# Patient Record
Sex: FEMALE | Race: WHITE | Employment: FULL TIME | URBAN - METROPOLITAN AREA
[De-identification: names, ages, dates, MRNs, and addresses within clinical notes are randomized per-mention and may not be internally consistent; named-entity substitution may affect disease eponyms.]

---

## 2022-11-05 SDOH — HEALTH STABILITY: PHYSICAL HEALTH: ON AVERAGE, HOW MANY MINUTES DO YOU ENGAGE IN EXERCISE AT THIS LEVEL?: 30 MIN

## 2022-11-05 SDOH — HEALTH STABILITY: PHYSICAL HEALTH: ON AVERAGE, HOW MANY DAYS PER WEEK DO YOU ENGAGE IN MODERATE TO STRENUOUS EXERCISE (LIKE A BRISK WALK)?: 5 DAYS

## 2022-11-05 ASSESSMENT — SOCIAL DETERMINANTS OF HEALTH (SDOH)
WITHIN THE LAST YEAR, HAVE TO BEEN RAPED OR FORCED TO HAVE ANY KIND OF SEXUAL ACTIVITY BY YOUR PARTNER OR EX-PARTNER?: NO
WITHIN THE LAST YEAR, HAVE YOU BEEN HUMILIATED OR EMOTIONALLY ABUSED IN OTHER WAYS BY YOUR PARTNER OR EX-PARTNER?: NO
WITHIN THE LAST YEAR, HAVE YOU BEEN KICKED, HIT, SLAPPED, OR OTHERWISE PHYSICALLY HURT BY YOUR PARTNER OR EX-PARTNER?: NO
WITHIN THE LAST YEAR, HAVE YOU BEEN AFRAID OF YOUR PARTNER OR EX-PARTNER?: NO

## 2022-11-08 ENCOUNTER — HOSPITAL ENCOUNTER (OUTPATIENT)
Dept: MAMMOGRAPHY | Age: 42
Discharge: HOME | End: 2022-11-11
Payer: COMMERCIAL

## 2022-11-08 ENCOUNTER — OFFICE VISIT (OUTPATIENT)
Dept: INTERNAL MEDICINE CLINIC | Facility: CLINIC | Age: 42
End: 2022-11-08
Payer: COMMERCIAL

## 2022-11-08 ENCOUNTER — APPOINTMENT (OUTPATIENT)
Dept: MAMMOGRAPHY | Age: 42
End: 2022-11-08
Payer: COMMERCIAL

## 2022-11-08 VITALS
WEIGHT: 134.2 LBS | HEIGHT: 60 IN | DIASTOLIC BLOOD PRESSURE: 93 MMHG | HEART RATE: 132 BPM | SYSTOLIC BLOOD PRESSURE: 143 MMHG | BODY MASS INDEX: 26.35 KG/M2

## 2022-11-08 DIAGNOSIS — R00.0 TACHYCARDIA: ICD-10-CM

## 2022-11-08 DIAGNOSIS — N63.10 MASS OF RIGHT BREAST, UNSPECIFIED QUADRANT: ICD-10-CM

## 2022-11-08 DIAGNOSIS — Z00.00 ROUTINE GENERAL MEDICAL EXAMINATION AT A HEALTH CARE FACILITY: ICD-10-CM

## 2022-11-08 DIAGNOSIS — N63.10 MASS OF RIGHT BREAST, UNSPECIFIED QUADRANT: Primary | ICD-10-CM

## 2022-11-08 DIAGNOSIS — Z12.31 BREAST CANCER SCREENING BY MAMMOGRAM: ICD-10-CM

## 2022-11-08 LAB
25(OH)D3 SERPL-MCNC: 36.6 NG/ML (ref 30–100)
ALBUMIN SERPL-MCNC: 2.2 G/DL (ref 3.5–5)
ALBUMIN/GLOB SERPL: 0.4 {RATIO} (ref 0.4–1.6)
ALP SERPL-CCNC: 165 U/L (ref 50–136)
ALT SERPL-CCNC: 15 U/L (ref 12–65)
ANION GAP SERPL CALC-SCNC: 6 MMOL/L (ref 2–11)
AST SERPL-CCNC: 15 U/L (ref 15–37)
BILIRUB SERPL-MCNC: 0.3 MG/DL (ref 0.2–1.1)
BUN SERPL-MCNC: 9 MG/DL (ref 6–23)
CALCIUM SERPL-MCNC: 8.9 MG/DL (ref 8.3–10.4)
CHLORIDE SERPL-SCNC: 103 MMOL/L (ref 101–110)
CHOLEST SERPL-MCNC: 109 MG/DL
CO2 SERPL-SCNC: 29 MMOL/L (ref 21–32)
CREAT SERPL-MCNC: 0.6 MG/DL (ref 0.6–1)
EST. AVERAGE GLUCOSE BLD GHB EST-MCNC: 114 MG/DL
GLOBULIN SER CALC-MCNC: 4.9 G/DL (ref 2.8–4.5)
GLUCOSE SERPL-MCNC: 119 MG/DL (ref 65–100)
HBA1C MFR BLD: 5.6 % (ref 4.8–5.6)
HCV AB SER QL: NONREACTIVE
HDLC SERPL-MCNC: 28 MG/DL (ref 40–60)
HDLC SERPL: 3.9 {RATIO}
HIV 1+2 AB+HIV1 P24 AG SERPL QL IA: NONREACTIVE
HIV 1/2 RESULT COMMENT: NORMAL
LDLC SERPL CALC-MCNC: 65.6 MG/DL
POTASSIUM SERPL-SCNC: 3.8 MMOL/L (ref 3.5–5.1)
PROT SERPL-MCNC: 7.1 G/DL (ref 6.3–8.2)
SODIUM SERPL-SCNC: 138 MMOL/L (ref 133–143)
TRIGL SERPL-MCNC: 77 MG/DL (ref 35–150)
TSH W FREE THYROID IF ABNORMAL: 2.3 UIU/ML (ref 0.36–3.74)
VLDLC SERPL CALC-MCNC: 15.4 MG/DL (ref 6–23)

## 2022-11-08 PROCEDURE — 77066 DX MAMMO INCL CAD BI: CPT

## 2022-11-08 PROCEDURE — 99205 OFFICE O/P NEW HI 60 MIN: CPT | Performed by: INTERNAL MEDICINE

## 2022-11-08 PROCEDURE — 76642 ULTRASOUND BREAST LIMITED: CPT

## 2022-11-08 RX ORDER — IBUPROFEN 200 MG
200 TABLET ORAL EVERY 6 HOURS PRN
COMMUNITY

## 2022-11-08 SDOH — ECONOMIC STABILITY: FOOD INSECURITY: WITHIN THE PAST 12 MONTHS, YOU WORRIED THAT YOUR FOOD WOULD RUN OUT BEFORE YOU GOT MONEY TO BUY MORE.: NEVER TRUE

## 2022-11-08 SDOH — ECONOMIC STABILITY: INCOME INSECURITY: IN THE LAST 12 MONTHS, WAS THERE A TIME WHEN YOU WERE NOT ABLE TO PAY THE MORTGAGE OR RENT ON TIME?: NO

## 2022-11-08 SDOH — ECONOMIC STABILITY: FOOD INSECURITY: WITHIN THE PAST 12 MONTHS, THE FOOD YOU BOUGHT JUST DIDN'T LAST AND YOU DIDN'T HAVE MONEY TO GET MORE.: NEVER TRUE

## 2022-11-08 SDOH — ECONOMIC STABILITY: HOUSING INSECURITY: IN THE LAST 12 MONTHS, HOW MANY PLACES HAVE YOU LIVED?: 1

## 2022-11-08 SDOH — ECONOMIC STABILITY: HOUSING INSECURITY
IN THE LAST 12 MONTHS, WAS THERE A TIME WHEN YOU DID NOT HAVE A STEADY PLACE TO SLEEP OR SLEPT IN A SHELTER (INCLUDING NOW)?: NO

## 2022-11-08 SDOH — ECONOMIC STABILITY: TRANSPORTATION INSECURITY
IN THE PAST 12 MONTHS, HAS LACK OF TRANSPORTATION KEPT YOU FROM MEETINGS, WORK, OR FROM GETTING THINGS NEEDED FOR DAILY LIVING?: NO

## 2022-11-08 SDOH — ECONOMIC STABILITY: TRANSPORTATION INSECURITY
IN THE PAST 12 MONTHS, HAS THE LACK OF TRANSPORTATION KEPT YOU FROM MEDICAL APPOINTMENTS OR FROM GETTING MEDICATIONS?: NO

## 2022-11-08 ASSESSMENT — ENCOUNTER SYMPTOMS
CONSTIPATION: 0
COUGH: 0
SHORTNESS OF BREATH: 0
DIARRHEA: 0
EYE REDNESS: 0
VOMITING: 0
ABDOMINAL PAIN: 0

## 2022-11-08 ASSESSMENT — PATIENT HEALTH QUESTIONNAIRE - PHQ9
4. FEELING TIRED OR HAVING LITTLE ENERGY: 3
SUM OF ALL RESPONSES TO PHQ QUESTIONS 1-9: 18
SUM OF ALL RESPONSES TO PHQ QUESTIONS 1-9: 18
2. FEELING DOWN, DEPRESSED OR HOPELESS: 3
9. THOUGHTS THAT YOU WOULD BE BETTER OFF DEAD, OR OF HURTING YOURSELF: 0
8. MOVING OR SPEAKING SO SLOWLY THAT OTHER PEOPLE COULD HAVE NOTICED. OR THE OPPOSITE, BEING SO FIGETY OR RESTLESS THAT YOU HAVE BEEN MOVING AROUND A LOT MORE THAN USUAL: 0
3. TROUBLE FALLING OR STAYING ASLEEP: 3
5. POOR APPETITE OR OVEREATING: 3
7. TROUBLE CONCENTRATING ON THINGS, SUCH AS READING THE NEWSPAPER OR WATCHING TELEVISION: 3
6. FEELING BAD ABOUT YOURSELF - OR THAT YOU ARE A FAILURE OR HAVE LET YOURSELF OR YOUR FAMILY DOWN: 0
SUM OF ALL RESPONSES TO PHQ QUESTIONS 1-9: 18
1. LITTLE INTEREST OR PLEASURE IN DOING THINGS: 3
SUM OF ALL RESPONSES TO PHQ QUESTIONS 1-9: 18
SUM OF ALL RESPONSES TO PHQ9 QUESTIONS 1 & 2: 6
10. IF YOU CHECKED OFF ANY PROBLEMS, HOW DIFFICULT HAVE THESE PROBLEMS MADE IT FOR YOU TO DO YOUR WORK, TAKE CARE OF THINGS AT HOME, OR GET ALONG WITH OTHER PEOPLE: 0

## 2022-11-08 ASSESSMENT — SOCIAL DETERMINANTS OF HEALTH (SDOH)
DO YOU BELONG TO ANY CLUBS OR ORGANIZATIONS SUCH AS CHURCH GROUPS UNIONS, FRATERNAL OR ATHLETIC GROUPS, OR SCHOOL GROUPS?: NO
IN A TYPICAL WEEK, HOW MANY TIMES DO YOU TALK ON THE PHONE WITH FAMILY, FRIENDS, OR NEIGHBORS?: MORE THAN THREE TIMES A WEEK
HOW OFTEN DO YOU ATTEND CHURCH OR RELIGIOUS SERVICES?: NEVER
HOW OFTEN DO YOU GET TOGETHER WITH FRIENDS OR RELATIVES?: NEVER
ARE YOU MARRIED, WIDOWED, DIVORCED, SEPARATED, NEVER MARRIED, OR LIVING WITH A PARTNER?: NEVER MARRIED
HOW OFTEN DO YOU ATTENT MEETINGS OF THE CLUB OR ORGANIZATION YOU BELONG TO?: NEVER
HOW HARD IS IT FOR YOU TO PAY FOR THE VERY BASICS LIKE FOOD, HOUSING, MEDICAL CARE, AND HEATING?: NOT HARD AT ALL

## 2022-11-08 ASSESSMENT — LIFESTYLE VARIABLES
HOW OFTEN DO YOU HAVE A DRINK CONTAINING ALCOHOL: NEVER
HOW MANY STANDARD DRINKS CONTAINING ALCOHOL DO YOU HAVE ON A TYPICAL DAY: PATIENT DOES NOT DRINK
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
## 2022-11-09 ENCOUNTER — TELEPHONE (OUTPATIENT)
Dept: INTERNAL MEDICINE CLINIC | Facility: CLINIC | Age: 42
End: 2022-11-09

## 2022-11-09 ENCOUNTER — OFFICE VISIT (OUTPATIENT)
Dept: SURGERY | Age: 42
End: 2022-11-09
Payer: COMMERCIAL

## 2022-11-09 ENCOUNTER — HOSPITAL ENCOUNTER (OUTPATIENT)
Dept: MAMMOGRAPHY | Age: 42
Discharge: HOME | End: 2022-11-12
Payer: COMMERCIAL

## 2022-11-09 ENCOUNTER — APPOINTMENT (OUTPATIENT)
Dept: MAMMOGRAPHY | Age: 42
End: 2022-11-09
Payer: COMMERCIAL

## 2022-11-09 VITALS
DIASTOLIC BLOOD PRESSURE: 97 MMHG | SYSTOLIC BLOOD PRESSURE: 149 MMHG | BODY MASS INDEX: 26.31 KG/M2 | WEIGHT: 134.7 LBS | HEART RATE: 129 BPM

## 2022-11-09 DIAGNOSIS — N63.15 MASS OVERLAPPING MULTIPLE QUADRANTS OF RIGHT BREAST: ICD-10-CM

## 2022-11-09 DIAGNOSIS — R92.8 ABNORMAL MAMMOGRAM OF RIGHT BREAST: ICD-10-CM

## 2022-11-09 LAB
BASOPHILS # BLD: 0.1 K/UL (ref 0–0.2)
BASOPHILS NFR BLD: 1 % (ref 0–2)
DIFFERENTIAL METHOD BLD: ABNORMAL
EOSINOPHIL # BLD: 0.6 K/UL (ref 0–0.8)
EOSINOPHIL NFR BLD: 4 % (ref 0.5–7.8)
ERYTHROCYTE [DISTWIDTH] IN BLOOD BY AUTOMATED COUNT: 19 % (ref 11.9–14.6)
HCT VFR BLD AUTO: 25.7 % (ref 35.8–46.3)
HGB BLD-MCNC: 6.7 G/DL (ref 11.7–15.4)
IMM GRANULOCYTES # BLD AUTO: 0.1 K/UL (ref 0–0.5)
IMM GRANULOCYTES NFR BLD AUTO: 1 % (ref 0–5)
LYMPHOCYTES # BLD: 1.3 K/UL (ref 0.5–4.6)
LYMPHOCYTES NFR BLD: 8 % (ref 13–44)
MCH RBC QN AUTO: 19.5 PG (ref 26.1–32.9)
MCHC RBC AUTO-ENTMCNC: 26.1 G/DL (ref 31.4–35)
MCV RBC AUTO: 74.7 FL (ref 82–102)
MONOCYTES # BLD: 1.4 K/UL (ref 0.1–1.3)
MONOCYTES NFR BLD: 9 % (ref 4–12)
NEUTS SEG # BLD: 12.6 K/UL (ref 1.7–8.2)
NEUTS SEG NFR BLD: 79 % (ref 43–78)
NRBC # BLD: 0 K/UL (ref 0–0.2)
PLATELET # BLD AUTO: 783 K/UL (ref 150–450)
PMV BLD AUTO: 8.8 FL (ref 9.4–12.3)
RBC # BLD AUTO: 3.44 M/UL (ref 4.05–5.2)
WBC # BLD AUTO: 16 K/UL (ref 4.3–11.1)

## 2022-11-09 PROCEDURE — 2500000003 HC RX 250 WO HCPCS: Performed by: INTERNAL MEDICINE

## 2022-11-09 PROCEDURE — 88305 TISSUE EXAM BY PATHOLOGIST: CPT

## 2022-11-09 PROCEDURE — 19083 BX BREAST 1ST LESION US IMAG: CPT

## 2022-11-09 PROCEDURE — 99204 OFFICE O/P NEW MOD 45 MIN: CPT | Performed by: STUDENT IN AN ORGANIZED HEALTH CARE EDUCATION/TRAINING PROGRAM

## 2022-11-09 RX ORDER — LIDOCAINE HYDROCHLORIDE 10 MG/ML
5 INJECTION, SOLUTION INFILTRATION; PERINEURAL ONCE
Status: COMPLETED | OUTPATIENT
Start: 2022-11-09 | End: 2022-11-09

## 2022-11-09 RX ADMIN — LIDOCAINE HYDROCHLORIDE 5 ML: 10 INJECTION, SOLUTION INFILTRATION; PERINEURAL at 16:37

## 2022-11-09 ASSESSMENT — PAIN DESCRIPTION - INTENSITY
RATING_2: 2
RATING_3: 0

## 2022-11-09 ASSESSMENT — PAIN SCALES - GENERAL: PAINLEVEL_OUTOF10: 0

## 2022-11-09 NOTE — TELEPHONE ENCOUNTER
Received critical lab value on CBC:           Lab Results   Component Value Date     WBC 16.0 (H) 11/08/2022     HGB 6.7 (LL) 11/08/2022     HCT 25.7 (L) 11/08/2022     MCV 74.7 (L) 11/08/2022      (H) 11/08/2022     LYMPHOPCT 8 (L) 11/08/2022     RBC 3.44 (L) 11/08/2022     MCH 19.5 (L) 11/08/2022     MCHC 26.1 (L) 11/08/2022     RDW 19.0 (H) 11/08/2022      There is leucocytosis with left shift, microcytic anemia and thrombocytosis; abnormalities are noted on three different cell lines. These results, added to the clinical presentation of a rapidly expanding solid breast mass, unexpected weight loss of about 30 Lb and fatigue, are highly suggestive of underlying malignant process. I had also previously reviewed the mammogram report. I attempted to speak to the patient as soon as results were in, but was unsuccessful. Left a voicemail with the recommendation to head over to the Emergency Room rather than waiting for an outpatient surgical consultation; the patient will most likely need extensive inpatient care including blood transfusions and she may be started on intravenous antibiotics. Attempted to contact again at 0730 and left a voicemail. Please try to call her and urge her to go be evaluated. ADDENDUM 8:25 AM:  Spoke over the phone with Dr. Mathew Lin. She will evaluate the patient at her clinic later today. Appreciate her availability and advice.   NICOLE
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
## 2022-11-09 NOTE — TELEPHONE ENCOUNTER
Received critical lab value on CBC:    Lab Results   Component Value Date    WBC 16.0 (H) 11/08/2022    HGB 6.7 (LL) 11/08/2022    HCT 25.7 (L) 11/08/2022    MCV 74.7 (L) 11/08/2022     (H) 11/08/2022    LYMPHOPCT 8 (L) 11/08/2022    RBC 3.44 (L) 11/08/2022    MCH 19.5 (L) 11/08/2022    MCHC 26.1 (L) 11/08/2022    RDW 19.0 (H) 11/08/2022     There is leucocytosis with left shift, microcytic anemia and thrombocytosis; abnormalities are noted on three different cell lines. These results, added to the clinical presentation of a rapidly expanding solid breast mass, unexpected weight loss of about 30 Lb and fatigue, are highly suggestive of underlying malignant process. I had also previously reviewed the mammogram report. I attempted to speak to the patient as soon as results were in, but was unsuccessful. Left a voicemail with the recommendation to head over to the Emergency Room rather than waiting for an outpatient surgical consultation; the patient will most likely need extensive inpatient care including blood transfusions and she may be started on intravenous antibiotics. Will try to contact the patient again in a few hours.

## 2022-11-10 ENCOUNTER — HOSPITAL ENCOUNTER (OUTPATIENT)
Dept: INFUSION THERAPY | Age: 42
Discharge: HOME | End: 2022-11-10
Payer: COMMERCIAL

## 2022-11-10 VITALS
HEART RATE: 95 BPM | TEMPERATURE: 98.4 F | OXYGEN SATURATION: 100 % | DIASTOLIC BLOOD PRESSURE: 83 MMHG | SYSTOLIC BLOOD PRESSURE: 137 MMHG | RESPIRATION RATE: 20 BRPM

## 2022-11-10 LAB
HCYS SERPL-SCNC: 5.7 UMOL/L (ref 0–14.5)
HISTORY CHECK: NORMAL

## 2022-11-10 PROCEDURE — 86923 COMPATIBILITY TEST ELECTRIC: CPT

## 2022-11-10 PROCEDURE — 36430 TRANSFUSION BLD/BLD COMPNT: CPT

## 2022-11-10 PROCEDURE — 86901 BLOOD TYPING SEROLOGIC RH(D): CPT

## 2022-11-10 PROCEDURE — P9016 RBC LEUKOCYTES REDUCED: HCPCS

## 2022-11-10 PROCEDURE — 2580000003 HC RX 258: Performed by: STUDENT IN AN ORGANIZED HEALTH CARE EDUCATION/TRAINING PROGRAM

## 2022-11-10 RX ORDER — SODIUM CHLORIDE 9 MG/ML
INJECTION, SOLUTION INTRAVENOUS PRN
Status: COMPLETED | OUTPATIENT
Start: 2022-11-10 | End: 2022-11-10

## 2022-11-10 RX ORDER — SODIUM CHLORIDE 0.9 % (FLUSH) 0.9 %
5-40 SYRINGE (ML) INJECTION PRN
Status: DISCONTINUED | OUTPATIENT
Start: 2022-11-10 | End: 2022-11-11 | Stop reason: HOSPADM

## 2022-11-10 RX ADMIN — SODIUM CHLORIDE, PRESERVATIVE FREE 10 ML: 5 INJECTION INTRAVENOUS at 11:59

## 2022-11-10 RX ADMIN — SODIUM CHLORIDE: 9 INJECTION, SOLUTION INTRAVENOUS at 12:00

## 2022-11-10 NOTE — PROGRESS NOTES
Arrived to the [facility]. 2u PRBCs completed. Patient tolerated well. Any issues or concerns during appointment: none. Patient instructed to call provider with temperature of 100.4 or greater or nausea/vomiting/ diarrhea or pain not controlled by medications  Discharged ambulatory.

## 2022-11-11 LAB
ABO + RH BLD: NORMAL
BLD PROD TYP BPU: NORMAL
BLD PROD TYP BPU: NORMAL
BLOOD BANK DISPENSE STATUS: NORMAL
BLOOD BANK DISPENSE STATUS: NORMAL
BLOOD GROUP ANTIBODIES SERPL: NORMAL
BPU ID: NORMAL
BPU ID: NORMAL
CROSSMATCH RESULT: NORMAL
CROSSMATCH RESULT: NORMAL
SPECIMEN EXP DATE BLD: NORMAL
UNIT DIVISION: 0
UNIT DIVISION: 0

## 2022-11-17 ENCOUNTER — CLINICAL DOCUMENTATION (OUTPATIENT)
Dept: SURGERY | Age: 42
End: 2022-11-17

## 2022-11-17 ENCOUNTER — TELEPHONE (OUTPATIENT)
Dept: ONCOLOGY | Age: 42
End: 2022-11-17

## 2022-11-17 DIAGNOSIS — C80.1 SPINDLE CELL CARCINOMA (HCC): Primary | ICD-10-CM

## 2022-11-17 RX ORDER — TRAMADOL HYDROCHLORIDE 50 MG/1
50 TABLET ORAL EVERY 4 HOURS PRN
Qty: 30 TABLET | Refills: 0 | Status: SHIPPED | OUTPATIENT
Start: 2022-11-17 | End: 2022-11-24

## 2022-11-17 NOTE — PROGRESS NOTES
I called and talked to Jannette Power on the phone. I discussed her pathology results with her in detail. 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 She does state that she is feeling a little bit better since her transfusion last week. I have discussed with her that we talked about her case in detail at breast conference. Our plan is for upfront surgery. With the size of the mass I am concerned about having any tissue for closure postoperatively so I have referred her to plastic surgery. I have placed a referral to oncology. I am setting her up with genetics testing given her family history. I have also placed an order for a PET scan. When she has done all of these I am going to see her in the office next week or the following to discuss surgical excision. 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

## 2022-11-17 NOTE — TELEPHONE ENCOUNTER
Received message from PET department that insurance was still pending for test tomorrow so it would need to be rescheduled for Monday 11-21. Patient notified and states that will work better for her.

## 2022-11-17 NOTE — TELEPHONE ENCOUNTER
PET stat per Dr. Maria R Crawford. Scheduled tomorrow 11-18-22 at 1000. Patient called and given instructions for NPO after midnight except for water and to drinking water tomorrow am.  [place] Teixeira 12. Patient is aware she will have a Genetics appt and oncology appt upcoming and will be called later on those appts. Patient is a  teacher at [employer].

## 2022-11-21 ENCOUNTER — HOSPITAL ENCOUNTER (OUTPATIENT)
Dept: PET IMAGING | Age: 42
Discharge: HOME | End: 2022-11-24
Payer: COMMERCIAL

## 2022-11-21 DIAGNOSIS — C80.1 SPINDLE CELL CARCINOMA (HCC): ICD-10-CM

## 2022-11-21 LAB
GLUCOSE BLD STRIP.AUTO-MCNC: 117 MG/DL (ref 65–100)
SERVICE CMNT-IMP: ABNORMAL

## 2022-11-21 PROCEDURE — 82962 GLUCOSE BLOOD TEST: CPT

## 2022-11-21 PROCEDURE — 6360000004 HC RX CONTRAST MEDICATION: Performed by: STUDENT IN AN ORGANIZED HEALTH CARE EDUCATION/TRAINING PROGRAM

## 2022-11-21 PROCEDURE — 3430000000 HC RX DIAGNOSTIC RADIOPHARMACEUTICAL: Performed by: STUDENT IN AN ORGANIZED HEALTH CARE EDUCATION/TRAINING PROGRAM

## 2022-11-21 PROCEDURE — 78815 PET IMAGE W/CT SKULL-THIGH: CPT

## 2022-11-21 PROCEDURE — A9552 F18 FDG: HCPCS | Performed by: STUDENT IN AN ORGANIZED HEALTH CARE EDUCATION/TRAINING PROGRAM

## 2022-11-21 RX ORDER — FLUDEOXYGLUCOSE F 18 200 MCI/ML
13.24 INJECTION, SOLUTION INTRAVENOUS
Status: COMPLETED | OUTPATIENT
Start: 2022-11-21 | End: 2022-11-21

## 2022-11-21 RX ADMIN — DIATRIZOATE MEGLUMINE AND DIATRIZOATE SODIUM 10 ML: 660; 100 LIQUID ORAL; RECTAL at 09:17

## 2022-11-21 RX ADMIN — FLUDEOXYGLUCOSE F 18 13.24 MILLICURIE: 200 INJECTION, SOLUTION INTRAVENOUS at 08:56

## 2022-11-21 NOTE — PROGRESS NOTES
[facility] - Initial Evaluation   Patient: Jaleesa Trujillo   YOB: 1980      Location: [facility] HEMATOLOGY AND ONCOLOGY - Provider participated in today's evaluation via telemedicine in [place], [place]. Patient completed today's evaluation from home. 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 Anjelica Walker has consented to a visit via telehealth in lieu of a face to face office visit during the coronavirus pandemic. Personalized risk assessment was performed and genetic testing options were reviewed. For full details, please see Risk Assessment and Discussion in this document. Following genetic counseling, Lakisha's action plan is:    PURSUES TESTING: CancerNext-Expanded panel of 77 genes associated with hereditary cancer (including BRCA1 and BRCA2) was offered and accepted. Test results should be available in approximately 3 weeks. Relevant Personal Medical History   Anjelica Walker is a 59-year-old female who was diagnosed with spindle cell carcinoma of the breast at age 46. Treatment will include upfront surgery.      Hormonal history:  Age of Menarche: 17  Number of Pregnancies: 0  Fertility Treatment: Denied  Contraception Use: Denied  Age of Menopause: Denied    Screening history:  Last mammogram: First mammogram  History of breast biopsy: N/A  Last pap smear: Summer 2020  Latest colonoscopy: N/A  Last dermatological exam: N/A  History of additional abnormal cancer screening: N/A    Social history:  Tobacco use: 15 years ago  Alcohol use: Denied    General medical history:  Past Medical History:   Diagnosis Date    ADHD (attention deficit hyperactivity disorder)     Anxiety     Hypertension       Surgical history:  Hysterectomy: Denied  Bilateral salpingo oophorectomy: Denied  Past Surgical History:   Procedure Laterality Date    BACK SURGERY      L2, 3,4 fused together  Four bolts and two rods    US BREAST NEEDLE BIOPSY RIGHT Right 11/9/2022    US BREAST NEEDLE BIOPSY RIGHT 11/9/2022 Erich Sever, MD [facility]        Family History   A detailed three-generation family history was taken today. Jermaine Kaur reported the following family history of cancer:   Ovarian cancer  Maternal grandmother, diagnosed at an unknown age   Pancreatic cancer   Maternal grandmother, diagnosed at an unknown age   Breast cancer   Paternal grandmother, diagnosed at an unknown age  Colon cancer  Maternal grandfather, diagnosed at an unknown age    Ethnicity: Upper sorbian/Persian  Ashkenazi Zoroastrian ancestry: Denied  Consanguinity: Denied    The history is by [employer] report solely, and our counseling and risk assessment is based on the accuracy of this provided history. Should the information collected be found to be changed or different, our original assessment and recommendations may change. A diagram of the pedigree is available as a scanned document in the Media tab of Epic. Risk Assessment and Discussion   We reviewed the differences between sporadic and hereditary cancers. We determined that Moisés family history is suspicious for a hereditary cancer syndrome because of her personal history of breast cancer at 46 and her family history of ovarian and pancreatic cancer. Breast cancer is one of the most common cancers for women in the [place]. In the general population, 1 in 8 women (or 12.5%) will develop breast cancer some time in the course of their lifetime. Approximately 5-10% of breast cancer is strongly hereditary; meaning, a pathogenic variant (mutation) in a gene is identified as the cause for cancer in a family.   Many, but not all, genes responsible for hereditary breast cancer have been identified. A majority of cases of hereditary breast cancer are due to pathogenic variants (mutations) in the BRCA1 or BRCA2 gene. Individuals who have a pathogenic variant in one of the BRCA genes are at an increased risk of developing breast cancer, ovarian cancer in women, prostate cancer in men, and pancreatic cancer. Based on her personal history of breast cancer at 46, [facility] (NCCN) criteria for evaluation of genes associated with hereditary breast cancer. We reviewed the benefits, risks, and limitations of testing using a multi-gene panel, as well as the possible outcomes for test results (positive, negative, or uncertain variant). Renee Hernandez elected to proceed with a 77-gene panel (Taylor Magallanes). This panel includes genes that are associated with hereditary cancer syndromes of which breast cancer is a feature. There are also genes included that have associations with other types of hereditary cancer. Evidence-based medical management guidelines have been published for individuals who carry pathogenic variants in some of the genes on this panel. However, other genes on this panel have not been used in clinical settings for very long and clear medical management guidelines may not yet exist.  We addressed Moisés concerns about cancer risks for family members. We also reviewed the Genetic Information Nondiscrimination Act (EDWIN) and more details can be located at www.ginahelp.org. If a pathogenic variant in a gene is identified in a family, there are risk reduction measures and early detection strategies that can be offered in most cases. We also discussed that pathogenic variants in some genes are associated with cancer risks for which no effective interventions are available.   Negative genetic testing in a family where no pathogenic variant has been identified in another relative cannot rule out hereditary cancer. Summary   Based on her personal history of breast cancer with family history of pancreatic and ovarian cancer in her maternal grandmother, Gume Kumar meets NCCN criteria for testing. ELECTS TESTING  Gume Kumar provided informed consent for the CancerNext-Expanded Panel. We can send genetic testing through [employer]. A saliva sample will be mailed to Gume Kumar today. Test results will be available in approximately 3 weeks. Gume Kumar elected to receive her results via telephone. If she is found to carry a pathogenic variant, follow-up is recommended so the results and medical management recommendations can be discussed in detail. We encouraged Gume Kumar to stay in contact with us regarding changes to the family history, as new information may impact our risk assessment. We enjoyed meeting with Gume Kumar and hope that we were able to answer her questions. Tito Howard MS  Genetic Counselor  [phone]    Time: 35 minutes with greater than 50% spent on counseling and coordination of care.     CC: [facility], DO

## 2022-11-22 ENCOUNTER — TELEMEDICINE (OUTPATIENT)
Dept: ONCOLOGY | Age: 42
End: 2022-11-22

## 2022-11-22 DIAGNOSIS — Z80.41 FAMILY HISTORY OF OVARIAN CANCER: ICD-10-CM

## 2022-11-22 DIAGNOSIS — Z80.0 FAMILY HISTORY OF PANCREATIC CANCER: ICD-10-CM

## 2022-11-22 DIAGNOSIS — C80.1 SPINDLE CELL CARCINOMA (HCC): Primary | ICD-10-CM

## 2022-11-23 ENCOUNTER — OFFICE VISIT (OUTPATIENT)
Dept: SURGERY | Age: 42
End: 2022-11-23
Payer: COMMERCIAL

## 2022-11-23 ENCOUNTER — PREP FOR PROCEDURE (OUTPATIENT)
Dept: SURGERY | Age: 42
End: 2022-11-23

## 2022-11-23 VITALS
DIASTOLIC BLOOD PRESSURE: 87 MMHG | BODY MASS INDEX: 26.5 KG/M2 | HEART RATE: 125 BPM | HEIGHT: 60 IN | SYSTOLIC BLOOD PRESSURE: 130 MMHG | WEIGHT: 135 LBS

## 2022-11-23 DIAGNOSIS — N63.15 MASS OVERLAPPING MULTIPLE QUADRANTS OF RIGHT BREAST: Primary | ICD-10-CM

## 2022-11-23 PROBLEM — C50.911 MALIGNANT NEOPLASM OF RIGHT BREAST (HCC): Status: ACTIVE | Noted: 2022-11-23

## 2022-11-23 PROCEDURE — 99204 OFFICE O/P NEW MOD 45 MIN: CPT | Performed by: STUDENT IN AN ORGANIZED HEALTH CARE EDUCATION/TRAINING PROGRAM

## 2022-11-23 RX ORDER — METHOCARBAMOL 750 MG/1
750 TABLET, FILM COATED ORAL 3 TIMES DAILY
Qty: 30 TABLET | Refills: 0 | Status: SHIPPED | OUTPATIENT
Start: 2022-11-23 | End: 2022-12-03

## 2022-11-23 RX ORDER — SODIUM CHLORIDE 9 MG/ML
INJECTION, SOLUTION INTRAVENOUS PRN
Status: CANCELLED | OUTPATIENT
Start: 2022-11-23

## 2022-11-23 RX ORDER — FERROUS SULFATE 325(65) MG
325 TABLET ORAL 2 TIMES DAILY
Qty: 180 TABLET | Refills: 1 | Status: SHIPPED | OUTPATIENT
Start: 2022-11-23

## 2022-11-23 RX ORDER — SODIUM CHLORIDE 0.9 % (FLUSH) 0.9 %
5-40 SYRINGE (ML) INJECTION PRN
Status: CANCELLED | OUTPATIENT
Start: 2022-11-23

## 2022-11-23 RX ORDER — SODIUM CHLORIDE 0.9 % (FLUSH) 0.9 %
5-40 SYRINGE (ML) INJECTION EVERY 12 HOURS SCHEDULED
Status: CANCELLED | OUTPATIENT
Start: 2022-11-23

## 2022-11-23 RX ORDER — ACETAMINOPHEN AND CODEINE PHOSPHATE 300; 30 MG/1; MG/1
1 TABLET ORAL EVERY 4 HOURS PRN
Qty: 18 TABLET | Refills: 0 | Status: SHIPPED | OUTPATIENT
Start: 2022-11-23 | End: 2022-11-26

## 2022-11-23 RX ORDER — GABAPENTIN 100 MG/1
100 CAPSULE ORAL 3 TIMES DAILY
Qty: 30 CAPSULE | Refills: 0 | Status: SHIPPED | OUTPATIENT
Start: 2022-11-23 | End: 2022-12-03

## 2022-11-23 SDOH — ECONOMIC STABILITY: FOOD INSECURITY: WITHIN THE PAST 12 MONTHS, YOU WORRIED THAT YOUR FOOD WOULD RUN OUT BEFORE YOU GOT MONEY TO BUY MORE.: NEVER TRUE

## 2022-11-23 SDOH — ECONOMIC STABILITY: FOOD INSECURITY: WITHIN THE PAST 12 MONTHS, THE FOOD YOU BOUGHT JUST DIDN'T LAST AND YOU DIDN'T HAVE MONEY TO GET MORE.: NEVER TRUE

## 2022-11-23 ASSESSMENT — SOCIAL DETERMINANTS OF HEALTH (SDOH): HOW HARD IS IT FOR YOU TO PAY FOR THE VERY BASICS LIKE FOOD, HOUSING, MEDICAL CARE, AND HEATING?: NOT HARD AT ALL
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
## 2022-11-30 DIAGNOSIS — C50.911 MALIGNANT NEOPLASM OF RIGHT FEMALE BREAST, UNSPECIFIED ESTROGEN RECEPTOR STATUS, UNSPECIFIED SITE OF BREAST (HCC): Primary | ICD-10-CM

## 2022-12-01 ENCOUNTER — ANESTHESIA EVENT (OUTPATIENT)
Dept: SURGERY | Age: 42
End: 2022-12-01
Payer: COMMERCIAL

## 2022-12-01 RX ORDER — TRAMADOL HYDROCHLORIDE 50 MG/1
50 TABLET ORAL EVERY 4 HOURS PRN
COMMUNITY

## 2022-12-01 NOTE — PROGRESS NOTES
Pre-procedure call completed. Instructed to arrive at 0800 for scheduled procedure on 12/01/2022. All questions answered.

## 2022-12-01 NOTE — PERIOP NOTE
Patient verified name and . Order for consent was found in EHR and matches case posting; patient verifies procedure. Type II surgery, phone assessment complete. Orders were received. Labs per surgeon: PAT Protocol  Labs per anesthesia protocol: Hgb and Type & Screen DOS   BMP drawn 22 results WNL   Patient answered medical/surgical history questions at their best of ability. All prior to admission medications documented in [facility]. Patient instructed to take the following medications the day of surgery according to anesthesia guidelines with a small sip of water: Tramadol (Ultram) if needed, gabapentin (Neurontin)  On the day before surgery please take Acetaminophen 1000mg in the morning and then again before bed. You may substitute for Tylenol 650 mg. Hold all vitamins 7 days prior to surgery and NSAIDS 5 days prior to surgery. Prescription meds to hold:Methocarbamol (Robaxin)  Patient instructed on the following:    > Arrive at [facility], time of arrival to be called the day before by 1700  > NPO after midnight, unless otherwise indicated, including gum, mints, and ice chips  > Responsible adult must drive patient to the hospital, stay during surgery, and patient will need supervision 24 hours after anesthesia  > Use antibacterial soap  in shower the night before surgery and on the morning of surgery  > All piercings must be removed prior to arrival.    > Leave all valuables (money and jewelry) at home but bring insurance card and ID on DOS.   > You may be required to pay a deductible or co-pay on the day of your procedure. You can pre-pay by calling [phone] if your surgery is at the [facility] or [phone] if your surgery is at the [facility]. > Do not wear make-up, nail polish, lotions, cologne, perfumes, powders, or oil on skin. Artificial nails are not permitted.

## 2022-12-02 ENCOUNTER — ANESTHESIA (OUTPATIENT)
Dept: SURGERY | Age: 42
End: 2022-12-02
Payer: COMMERCIAL

## 2022-12-02 ENCOUNTER — HOSPITAL ENCOUNTER (OUTPATIENT)
Dept: NUCLEAR MEDICINE | Age: 42
Discharge: HOME | DRG: 581 | End: 2022-12-02
Payer: COMMERCIAL

## 2022-12-02 ENCOUNTER — HOSPITAL ENCOUNTER (INPATIENT)
Age: 42
LOS: 1 days | Discharge: HOME | DRG: 581 | End: 2022-12-04
Attending: STUDENT IN AN ORGANIZED HEALTH CARE EDUCATION/TRAINING PROGRAM | Admitting: STUDENT IN AN ORGANIZED HEALTH CARE EDUCATION/TRAINING PROGRAM
Payer: COMMERCIAL

## 2022-12-02 DIAGNOSIS — N63.15 MASS OVERLAPPING MULTIPLE QUADRANTS OF RIGHT BREAST: Primary | ICD-10-CM

## 2022-12-02 DIAGNOSIS — C50.911 MALIGNANT NEOPLASM OF RIGHT FEMALE BREAST, UNSPECIFIED ESTROGEN RECEPTOR STATUS, UNSPECIFIED SITE OF BREAST (HCC): ICD-10-CM

## 2022-12-02 PROBLEM — N63.10 BREAST MASS, RIGHT: Status: ACTIVE | Noted: 2022-12-02

## 2022-12-02 LAB
ERYTHROCYTE [DISTWIDTH] IN BLOOD BY AUTOMATED COUNT: 19.5 % (ref 11.9–14.6)
HCG UR QL: NEGATIVE
HCT VFR BLD AUTO: 23.5 % (ref 35.8–46.3)
HCT VFR BLD AUTO: 29.1 % (ref 35.8–46.3)
HGB BLD-MCNC: 6.5 G/DL (ref 11.7–15.4)
HGB BLD-MCNC: 7.9 G/DL (ref 11.7–15.4)
HGB BLD-MCNC: 8.2 G/DL (ref 11.7–15.4)
HISTORY CHECK: NORMAL
MCH RBC QN AUTO: 22.2 PG (ref 26.1–32.9)
MCHC RBC AUTO-ENTMCNC: 28.2 G/DL (ref 31.4–35)
MCV RBC AUTO: 78.9 FL (ref 82–102)
NRBC # BLD: 0 K/UL (ref 0–0.2)
PLATELET # BLD AUTO: 625 K/UL (ref 150–450)
PMV BLD AUTO: 8.4 FL (ref 9.4–12.3)
RBC # BLD AUTO: 3.69 M/UL (ref 4.05–5.2)
WBC # BLD AUTO: 17.8 K/UL (ref 4.3–11.1)

## 2022-12-02 PROCEDURE — 2580000003 HC RX 258: Performed by: NURSE ANESTHETIST, CERTIFIED REGISTERED

## 2022-12-02 PROCEDURE — 38900 IO MAP OF SENT LYMPH NODE: CPT | Performed by: STUDENT IN AN ORGANIZED HEALTH CARE EDUCATION/TRAINING PROGRAM

## 2022-12-02 PROCEDURE — 6360000002 HC RX W HCPCS: Performed by: ANESTHESIOLOGY

## 2022-12-02 PROCEDURE — 2500000003 HC RX 250 WO HCPCS: Performed by: SURGERY

## 2022-12-02 PROCEDURE — 2500000003 HC RX 250 WO HCPCS: Performed by: NURSE ANESTHETIST, CERTIFIED REGISTERED

## 2022-12-02 PROCEDURE — 86850 RBC ANTIBODY SCREEN: CPT

## 2022-12-02 PROCEDURE — 38525 BIOPSY/REMOVAL LYMPH NODES: CPT | Performed by: STUDENT IN AN ORGANIZED HEALTH CARE EDUCATION/TRAINING PROGRAM

## 2022-12-02 PROCEDURE — A9541 TC99M SULFUR COLLOID: HCPCS | Performed by: NURSE PRACTITIONER

## 2022-12-02 PROCEDURE — 6370000000 HC RX 637 (ALT 250 FOR IP): Performed by: STUDENT IN AN ORGANIZED HEALTH CARE EDUCATION/TRAINING PROGRAM

## 2022-12-02 PROCEDURE — 88305 TISSUE EXAM BY PATHOLOGIST: CPT

## 2022-12-02 PROCEDURE — 6370000000 HC RX 637 (ALT 250 FOR IP): Performed by: ANESTHESIOLOGY

## 2022-12-02 PROCEDURE — 6360000002 HC RX W HCPCS: Performed by: STUDENT IN AN ORGANIZED HEALTH CARE EDUCATION/TRAINING PROGRAM

## 2022-12-02 PROCEDURE — 6360000002 HC RX W HCPCS: Performed by: NURSE ANESTHETIST, CERTIFIED REGISTERED

## 2022-12-02 PROCEDURE — 2580000003 HC RX 258: Performed by: STUDENT IN AN ORGANIZED HEALTH CARE EDUCATION/TRAINING PROGRAM

## 2022-12-02 PROCEDURE — 3430000000 HC RX DIAGNOSTIC RADIOPHARMACEUTICAL: Performed by: NURSE PRACTITIONER

## 2022-12-02 PROCEDURE — 07B50ZX EXCISION OF RIGHT AXILLARY LYMPHATIC, OPEN APPROACH, DIAGNOSTIC: ICD-10-PCS | Performed by: STUDENT IN AN ORGANIZED HEALTH CARE EDUCATION/TRAINING PROGRAM

## 2022-12-02 PROCEDURE — 3700000001 HC ADD 15 MINUTES (ANESTHESIA): Performed by: STUDENT IN AN ORGANIZED HEALTH CARE EDUCATION/TRAINING PROGRAM

## 2022-12-02 PROCEDURE — P9016 RBC LEUKOCYTES REDUCED: HCPCS

## 2022-12-02 PROCEDURE — 85018 HEMOGLOBIN: CPT

## 2022-12-02 PROCEDURE — 7100000001 HC PACU RECOVERY - ADDTL 15 MIN: Performed by: STUDENT IN AN ORGANIZED HEALTH CARE EDUCATION/TRAINING PROGRAM

## 2022-12-02 PROCEDURE — 86920 COMPATIBILITY TEST SPIN: CPT

## 2022-12-02 PROCEDURE — 85027 COMPLETE CBC AUTOMATED: CPT

## 2022-12-02 PROCEDURE — 88307 TISSUE EXAM BY PATHOLOGIST: CPT

## 2022-12-02 PROCEDURE — 86923 COMPATIBILITY TEST ELECTRIC: CPT

## 2022-12-02 PROCEDURE — 7100000000 HC PACU RECOVERY - FIRST 15 MIN: Performed by: STUDENT IN AN ORGANIZED HEALTH CARE EDUCATION/TRAINING PROGRAM

## 2022-12-02 PROCEDURE — 2580000003 HC RX 258: Performed by: ANESTHESIOLOGY

## 2022-12-02 PROCEDURE — 81025 URINE PREGNANCY TEST: CPT

## 2022-12-02 PROCEDURE — 2720000010 HC SURG SUPPLY STERILE: Performed by: STUDENT IN AN ORGANIZED HEALTH CARE EDUCATION/TRAINING PROGRAM

## 2022-12-02 PROCEDURE — 3700000000 HC ANESTHESIA ATTENDED CARE: Performed by: STUDENT IN AN ORGANIZED HEALTH CARE EDUCATION/TRAINING PROGRAM

## 2022-12-02 PROCEDURE — 2709999900 HC NON-CHARGEABLE SUPPLY: Performed by: STUDENT IN AN ORGANIZED HEALTH CARE EDUCATION/TRAINING PROGRAM

## 2022-12-02 PROCEDURE — 85014 HEMATOCRIT: CPT

## 2022-12-02 PROCEDURE — 78195 LYMPH SYSTEM IMAGING: CPT

## 2022-12-02 PROCEDURE — 0HTT0ZZ RESECTION OF RIGHT BREAST, OPEN APPROACH: ICD-10-PCS | Performed by: STUDENT IN AN ORGANIZED HEALTH CARE EDUCATION/TRAINING PROGRAM

## 2022-12-02 PROCEDURE — 19303 MAST SIMPLE COMPLETE: CPT | Performed by: STUDENT IN AN ORGANIZED HEALTH CARE EDUCATION/TRAINING PROGRAM

## 2022-12-02 PROCEDURE — 6360000002 HC RX W HCPCS: Performed by: SURGERY

## 2022-12-02 PROCEDURE — 3600000014 HC SURGERY LEVEL 4 ADDTL 15MIN: Performed by: STUDENT IN AN ORGANIZED HEALTH CARE EDUCATION/TRAINING PROGRAM

## 2022-12-02 PROCEDURE — 3600000004 HC SURGERY LEVEL 4 BASE: Performed by: STUDENT IN AN ORGANIZED HEALTH CARE EDUCATION/TRAINING PROGRAM

## 2022-12-02 RX ORDER — SODIUM CHLORIDE 0.9 % (FLUSH) 0.9 %
5-40 SYRINGE (ML) INJECTION EVERY 12 HOURS SCHEDULED
Status: DISCONTINUED | OUTPATIENT
Start: 2022-12-02 | End: 2022-12-04 | Stop reason: HOSPADM

## 2022-12-02 RX ORDER — LIDOCAINE HYDROCHLORIDE 10 MG/ML
INJECTION, SOLUTION INFILTRATION; PERINEURAL PRN
Status: DISCONTINUED | OUTPATIENT
Start: 2022-12-02 | End: 2022-12-02 | Stop reason: HOSPADM

## 2022-12-02 RX ORDER — ACETAMINOPHEN 500 MG
1000 TABLET ORAL ONCE
Status: DISCONTINUED | OUTPATIENT
Start: 2022-12-02 | End: 2022-12-02 | Stop reason: HOSPADM

## 2022-12-02 RX ORDER — SODIUM CHLORIDE 0.9 % (FLUSH) 0.9 %
5-40 SYRINGE (ML) INJECTION PRN
Status: DISCONTINUED | OUTPATIENT
Start: 2022-12-02 | End: 2022-12-02 | Stop reason: HOSPADM

## 2022-12-02 RX ORDER — ROCURONIUM BROMIDE 10 MG/ML
INJECTION, SOLUTION INTRAVENOUS PRN
Status: DISCONTINUED | OUTPATIENT
Start: 2022-12-02 | End: 2022-12-02 | Stop reason: SDUPTHER

## 2022-12-02 RX ORDER — ONDANSETRON 2 MG/ML
4 INJECTION INTRAMUSCULAR; INTRAVENOUS
Status: DISCONTINUED | OUTPATIENT
Start: 2022-12-02 | End: 2022-12-02 | Stop reason: HOSPADM

## 2022-12-02 RX ORDER — NEOSTIGMINE METHYLSULFATE 1 MG/ML
INJECTION, SOLUTION INTRAVENOUS PRN
Status: DISCONTINUED | OUTPATIENT
Start: 2022-12-02 | End: 2022-12-02 | Stop reason: SDUPTHER

## 2022-12-02 RX ORDER — EPINEPHRINE 1 MG/ML(1)
AMPUL (ML) INJECTION PRN
Status: DISCONTINUED | OUTPATIENT
Start: 2022-12-02 | End: 2022-12-02 | Stop reason: HOSPADM

## 2022-12-02 RX ORDER — HYDROMORPHONE HCL 110MG/55ML
0.5 PATIENT CONTROLLED ANALGESIA SYRINGE INTRAVENOUS EVERY 5 MIN PRN
Status: DISCONTINUED | OUTPATIENT
Start: 2022-12-02 | End: 2022-12-02 | Stop reason: HOSPADM

## 2022-12-02 RX ORDER — OXYCODONE HYDROCHLORIDE 5 MG/1
5 TABLET ORAL EVERY 4 HOURS PRN
Status: DISCONTINUED | OUTPATIENT
Start: 2022-12-02 | End: 2022-12-04 | Stop reason: HOSPADM

## 2022-12-02 RX ORDER — ONDANSETRON 2 MG/ML
INJECTION INTRAMUSCULAR; INTRAVENOUS PRN
Status: DISCONTINUED | OUTPATIENT
Start: 2022-12-02 | End: 2022-12-02 | Stop reason: SDUPTHER

## 2022-12-02 RX ORDER — ONDANSETRON 2 MG/ML
4 INJECTION INTRAMUSCULAR; INTRAVENOUS EVERY 6 HOURS PRN
Status: DISCONTINUED | OUTPATIENT
Start: 2022-12-02 | End: 2022-12-04 | Stop reason: HOSPADM

## 2022-12-02 RX ORDER — SODIUM CHLORIDE 9 MG/ML
INJECTION, SOLUTION INTRAVENOUS PRN
Status: DISCONTINUED | OUTPATIENT
Start: 2022-12-02 | End: 2022-12-02 | Stop reason: HOSPADM

## 2022-12-02 RX ORDER — KETAMINE HYDROCHLORIDE 50 MG/ML
INJECTION, SOLUTION, CONCENTRATE INTRAMUSCULAR; INTRAVENOUS PRN
Status: DISCONTINUED | OUTPATIENT
Start: 2022-12-02 | End: 2022-12-02 | Stop reason: SDUPTHER

## 2022-12-02 RX ORDER — SODIUM CHLORIDE 9 MG/ML
INJECTION, SOLUTION INTRAVENOUS PRN
Status: DISCONTINUED | OUTPATIENT
Start: 2022-12-02 | End: 2022-12-03

## 2022-12-02 RX ORDER — LIDOCAINE HYDROCHLORIDE 10 MG/ML
1 INJECTION, SOLUTION INFILTRATION; PERINEURAL
Status: DISCONTINUED | OUTPATIENT
Start: 2022-12-02 | End: 2022-12-02 | Stop reason: HOSPADM

## 2022-12-02 RX ORDER — METHOCARBAMOL 750 MG/1
750 TABLET, FILM COATED ORAL 3 TIMES DAILY PRN
Status: DISCONTINUED | OUTPATIENT
Start: 2022-12-02 | End: 2022-12-04 | Stop reason: HOSPADM

## 2022-12-02 RX ORDER — LIDOCAINE HYDROCHLORIDE ANHYDROUS AND DEXTROSE MONOHYDRATE .4; 5 G/100ML; G/100ML
INJECTION, SOLUTION INTRAVENOUS CONTINUOUS PRN
Status: DISCONTINUED | OUTPATIENT
Start: 2022-12-02 | End: 2022-12-02 | Stop reason: SDUPTHER

## 2022-12-02 RX ORDER — SODIUM CHLORIDE, SODIUM LACTATE, POTASSIUM CHLORIDE, CALCIUM CHLORIDE 600; 310; 30; 20 MG/100ML; MG/100ML; MG/100ML; MG/100ML
INJECTION, SOLUTION INTRAVENOUS CONTINUOUS
Status: DISCONTINUED | OUTPATIENT
Start: 2022-12-02 | End: 2022-12-02 | Stop reason: HOSPADM

## 2022-12-02 RX ORDER — PROPOFOL 10 MG/ML
INJECTION, EMULSION INTRAVENOUS PRN
Status: DISCONTINUED | OUTPATIENT
Start: 2022-12-02 | End: 2022-12-02 | Stop reason: SDUPTHER

## 2022-12-02 RX ORDER — SODIUM CHLORIDE 0.9 % (FLUSH) 0.9 %
5-40 SYRINGE (ML) INJECTION EVERY 12 HOURS SCHEDULED
Status: DISCONTINUED | OUTPATIENT
Start: 2022-12-02 | End: 2022-12-02 | Stop reason: HOSPADM

## 2022-12-02 RX ORDER — HYDROMORPHONE HCL 110MG/55ML
PATIENT CONTROLLED ANALGESIA SYRINGE INTRAVENOUS PRN
Status: DISCONTINUED | OUTPATIENT
Start: 2022-12-02 | End: 2022-12-02 | Stop reason: SDUPTHER

## 2022-12-02 RX ORDER — FENTANYL CITRATE 50 UG/ML
INJECTION, SOLUTION INTRAMUSCULAR; INTRAVENOUS PRN
Status: DISCONTINUED | OUTPATIENT
Start: 2022-12-02 | End: 2022-12-02 | Stop reason: SDUPTHER

## 2022-12-02 RX ORDER — GABAPENTIN 100 MG/1
100 CAPSULE ORAL 3 TIMES DAILY PRN
Status: DISCONTINUED | OUTPATIENT
Start: 2022-12-02 | End: 2022-12-04 | Stop reason: HOSPADM

## 2022-12-02 RX ORDER — FERROUS SULFATE 325(65) MG
325 TABLET ORAL 2 TIMES DAILY
Status: DISCONTINUED | OUTPATIENT
Start: 2022-12-02 | End: 2022-12-04 | Stop reason: HOSPADM

## 2022-12-02 RX ORDER — SODIUM CHLORIDE 9 MG/ML
INJECTION, SOLUTION INTRAVENOUS PRN
Status: DISCONTINUED | OUTPATIENT
Start: 2022-12-02 | End: 2022-12-04 | Stop reason: HOSPADM

## 2022-12-02 RX ORDER — DEXAMETHASONE SODIUM PHOSPHATE 10 MG/ML
INJECTION INTRAMUSCULAR; INTRAVENOUS PRN
Status: DISCONTINUED | OUTPATIENT
Start: 2022-12-02 | End: 2022-12-02 | Stop reason: SDUPTHER

## 2022-12-02 RX ORDER — SENNA AND DOCUSATE SODIUM 50; 8.6 MG/1; MG/1
1 TABLET, FILM COATED ORAL 2 TIMES DAILY
Status: DISCONTINUED | OUTPATIENT
Start: 2022-12-02 | End: 2022-12-04 | Stop reason: HOSPADM

## 2022-12-02 RX ORDER — METHYLENE BLUE 10 MG/ML
INJECTION INTRAVENOUS PRN
Status: DISCONTINUED | OUTPATIENT
Start: 2022-12-02 | End: 2022-12-02 | Stop reason: HOSPADM

## 2022-12-02 RX ORDER — SODIUM CHLORIDE 9 MG/ML
INJECTION, SOLUTION INTRAVENOUS CONTINUOUS PRN
Status: DISCONTINUED | OUTPATIENT
Start: 2022-12-02 | End: 2022-12-02 | Stop reason: SDUPTHER

## 2022-12-02 RX ORDER — MIDAZOLAM HYDROCHLORIDE 2 MG/2ML
2 INJECTION, SOLUTION INTRAMUSCULAR; INTRAVENOUS
Status: COMPLETED | OUTPATIENT
Start: 2022-12-02 | End: 2022-12-02

## 2022-12-02 RX ORDER — SODIUM CHLORIDE 0.9 % (FLUSH) 0.9 %
5-40 SYRINGE (ML) INJECTION PRN
Status: DISCONTINUED | OUTPATIENT
Start: 2022-12-02 | End: 2022-12-04 | Stop reason: HOSPADM

## 2022-12-02 RX ORDER — OXYCODONE HYDROCHLORIDE 5 MG/1
5 TABLET ORAL PRN
Status: DISCONTINUED | OUTPATIENT
Start: 2022-12-02 | End: 2022-12-02 | Stop reason: HOSPADM

## 2022-12-02 RX ORDER — EPHEDRINE SULFATE/0.9% NACL/PF 50 MG/5 ML
SYRINGE (ML) INTRAVENOUS PRN
Status: DISCONTINUED | OUTPATIENT
Start: 2022-12-02 | End: 2022-12-02 | Stop reason: SDUPTHER

## 2022-12-02 RX ORDER — SCOLOPAMINE TRANSDERMAL SYSTEM 1 MG/1
1 PATCH, EXTENDED RELEASE TRANSDERMAL
Status: DISCONTINUED | OUTPATIENT
Start: 2022-12-02 | End: 2022-12-02 | Stop reason: HOSPADM

## 2022-12-02 RX ORDER — GLYCOPYRROLATE 0.2 MG/ML
INJECTION INTRAMUSCULAR; INTRAVENOUS PRN
Status: DISCONTINUED | OUTPATIENT
Start: 2022-12-02 | End: 2022-12-02 | Stop reason: SDUPTHER

## 2022-12-02 RX ORDER — ONDANSETRON 4 MG/1
4 TABLET, ORALLY DISINTEGRATING ORAL EVERY 8 HOURS PRN
Status: DISCONTINUED | OUTPATIENT
Start: 2022-12-02 | End: 2022-12-04 | Stop reason: HOSPADM

## 2022-12-02 RX ORDER — LIDOCAINE HYDROCHLORIDE 20 MG/ML
INJECTION, SOLUTION EPIDURAL; INFILTRATION; INTRACAUDAL; PERINEURAL PRN
Status: DISCONTINUED | OUTPATIENT
Start: 2022-12-02 | End: 2022-12-02 | Stop reason: SDUPTHER

## 2022-12-02 RX ORDER — OXYCODONE HYDROCHLORIDE 5 MG/1
10 TABLET ORAL PRN
Status: DISCONTINUED | OUTPATIENT
Start: 2022-12-02 | End: 2022-12-02 | Stop reason: HOSPADM

## 2022-12-02 RX ORDER — ACETAMINOPHEN 325 MG/1
650 TABLET ORAL EVERY 6 HOURS
Status: DISCONTINUED | OUTPATIENT
Start: 2022-12-02 | End: 2022-12-04 | Stop reason: HOSPADM

## 2022-12-02 RX ORDER — HYDROMORPHONE HCL 110MG/55ML
0.25 PATIENT CONTROLLED ANALGESIA SYRINGE INTRAVENOUS EVERY 5 MIN PRN
Status: DISCONTINUED | OUTPATIENT
Start: 2022-12-02 | End: 2022-12-02 | Stop reason: HOSPADM

## 2022-12-02 RX ADMIN — SENNOSIDES AND DOCUSATE SODIUM 1 TABLET: 8.6; 5 TABLET ORAL at 21:51

## 2022-12-02 RX ADMIN — PROPOFOL 150 MG: 10 INJECTION, EMULSION INTRAVENOUS at 12:23

## 2022-12-02 RX ADMIN — OXYCODONE 5 MG: 5 TABLET ORAL at 21:51

## 2022-12-02 RX ADMIN — Medication 10 MG: at 14:38

## 2022-12-02 RX ADMIN — SODIUM CHLORIDE, POTASSIUM CHLORIDE, SODIUM LACTATE AND CALCIUM CHLORIDE: 600; 310; 30; 20 INJECTION, SOLUTION INTRAVENOUS at 09:21

## 2022-12-02 RX ADMIN — SODIUM CHLORIDE, PRESERVATIVE FREE 10 ML: 5 INJECTION INTRAVENOUS at 21:52

## 2022-12-02 RX ADMIN — Medication 2000 MG: at 12:33

## 2022-12-02 RX ADMIN — KETAMINE HYDROCHLORIDE 20 MG: 50 INJECTION, SOLUTION INTRAMUSCULAR; INTRAVENOUS at 12:23

## 2022-12-02 RX ADMIN — ROCURONIUM BROMIDE 25 MG: 50 INJECTION, SOLUTION INTRAVENOUS at 12:23

## 2022-12-02 RX ADMIN — SODIUM CHLORIDE: 900 INJECTION INTRAVENOUS at 14:39

## 2022-12-02 RX ADMIN — GLYCOPYRROLATE 0.4 MG: 0.2 INJECTION, SOLUTION INTRAMUSCULAR; INTRAVENOUS at 14:59

## 2022-12-02 RX ADMIN — FENTANYL CITRATE 100 MCG: 50 INJECTION, SOLUTION INTRAMUSCULAR; INTRAVENOUS at 12:23

## 2022-12-02 RX ADMIN — LIDOCAINE HYDROCHLORIDE 40 MG: 20 INJECTION, SOLUTION EPIDURAL; INFILTRATION; INTRACAUDAL; PERINEURAL at 12:23

## 2022-12-02 RX ADMIN — Medication 255 MICRO CURIE: at 10:15

## 2022-12-02 RX ADMIN — HYDROMORPHONE HYDROCHLORIDE 1 MG: 2 INJECTION INTRAMUSCULAR; INTRAVENOUS; SUBCUTANEOUS at 12:48

## 2022-12-02 RX ADMIN — Medication 3 MG: at 14:59

## 2022-12-02 RX ADMIN — PHENYLEPHRINE HYDROCHLORIDE 100 MCG: 10 INJECTION INTRAVENOUS at 13:37

## 2022-12-02 RX ADMIN — ACETAMINOPHEN 650 MG: 325 TABLET ORAL at 18:36

## 2022-12-02 RX ADMIN — SODIUM CHLORIDE: 900 INJECTION INTRAVENOUS at 12:30

## 2022-12-02 RX ADMIN — KETAMINE HYDROCHLORIDE 20 MG: 50 INJECTION, SOLUTION INTRAMUSCULAR; INTRAVENOUS at 13:25

## 2022-12-02 RX ADMIN — MIDAZOLAM 2 MG: 1 INJECTION INTRAMUSCULAR; INTRAVENOUS at 11:42

## 2022-12-02 RX ADMIN — OXYCODONE 5 MG: 5 TABLET ORAL at 18:36

## 2022-12-02 RX ADMIN — KETAMINE HYDROCHLORIDE 20 MG: 50 INJECTION, SOLUTION INTRAMUSCULAR; INTRAVENOUS at 14:30

## 2022-12-02 RX ADMIN — LIDOCAINE HYDROCHLORIDE 1 MG/KG/HR: 4 INJECTION, SOLUTION INTRAVENOUS at 12:30

## 2022-12-02 RX ADMIN — HYDROMORPHONE HYDROCHLORIDE 0.5 MG: 2 INJECTION INTRAMUSCULAR; INTRAVENOUS; SUBCUTANEOUS at 13:25

## 2022-12-02 RX ADMIN — PHENYLEPHRINE HYDROCHLORIDE 100 MCG: 10 INJECTION INTRAVENOUS at 13:31

## 2022-12-02 RX ADMIN — ACETAMINOPHEN 650 MG: 325 TABLET ORAL at 22:38

## 2022-12-02 RX ADMIN — FERROUS SULFATE TAB 325 MG (65 MG ELEMENTAL FE) 325 MG: 325 (65 FE) TAB at 21:51

## 2022-12-02 RX ADMIN — Medication 245 MICRO CURIE: at 10:15

## 2022-12-02 RX ADMIN — HYDROMORPHONE HYDROCHLORIDE 0.5 MG: 2 INJECTION INTRAMUSCULAR; INTRAVENOUS; SUBCUTANEOUS at 14:41

## 2022-12-02 RX ADMIN — DEXAMETHASONE SODIUM PHOSPHATE 10 MG: 10 INJECTION INTRAMUSCULAR; INTRAVENOUS at 12:38

## 2022-12-02 RX ADMIN — ONDANSETRON 4 MG: 2 INJECTION INTRAMUSCULAR; INTRAVENOUS at 12:38

## 2022-12-02 ASSESSMENT — PAIN - FUNCTIONAL ASSESSMENT
PAIN_FUNCTIONAL_ASSESSMENT: NONE - DENIES PAIN
PAIN_FUNCTIONAL_ASSESSMENT: 0-10

## 2022-12-02 ASSESSMENT — PAIN DESCRIPTION - ORIENTATION: ORIENTATION: RIGHT

## 2022-12-02 ASSESSMENT — PAIN DESCRIPTION - DESCRIPTORS
DESCRIPTORS: STABBING
DESCRIPTORS: ACHING
DESCRIPTORS: ACHING;DISCOMFORT

## 2022-12-02 ASSESSMENT — PAIN SCALES - GENERAL
PAINLEVEL_OUTOF10: 6
PAINLEVEL_OUTOF10: 4

## 2022-12-02 ASSESSMENT — PAIN DESCRIPTION - LOCATION
LOCATION: INCISION
LOCATION: BREAST
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
## 2022-12-02 NOTE — ANESTHESIA PROCEDURE NOTES
Airway  Date/Time: 12/2/2022 12:26 PM  Urgency: elective    Airway not difficult    General Information and Staff    Patient location during procedure: OR  Resident/CRNA: Neda Jean-Baptiste APRN - CRNA    Indications and Patient Condition  Indications for airway management: anesthesia and airway protection  Sedation level: deep  Preoxygenated: yes  Patient position: sniffing  MILS not maintained throughout  Mask difficulty assessment: vent by bag mask    Final Airway Details  Final airway type: endotracheal airway      Successful airway: ETT     Successful intubation technique: direct laryngoscopy  Endotracheal tube insertion site: oral  Blade: Oni  Blade size: #3  ETT size (mm): 7.0  Cormack-Lehane Classification: grade I - full view of glottis  Placement verified by: chest auscultation and capnometry   Measured from: lips  ETT to lips (cm): 19  Number of attempts at approach: 1  Ventilation between attempts: bag mask  Number of other approaches attempted: 0    Additional Comments  LIPS/DENTITION AS PRE-OP.

## 2022-12-02 NOTE — PROGRESS NOTES
TRANSFER - IN REPORT:    Verbal report received from Berkley Zaragoza RN on [employer]  being received from PACU for routine progression of patient care      Report consisted of patient's Situation, Background, Assessment and   Recommendations(SBAR). Information from the following report(s) Nurse Handoff Report was reviewed with the receiving nurse. Opportunity for questions and clarification was provided. Assessment completed upon patient's arrival to unit and care assumed.
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
## 2022-12-02 NOTE — PERIOP NOTE
Patient has spoken with BECKY GARCIA. Consent has been verified, signed and witnessed by this RN. 2 mg of Versed given SIVP per orders. Pulse ox monitor in place & tracing appropriately. Bed in low, locked position with side rails up x 2 and call light in reach. Instructed pt to call with any needs and to remain in bed. Verbalizes understanding. Mariajose at bedside.

## 2022-12-02 NOTE — PERIOP NOTE
TRANSFER - OUT REPORT:    Verbal report given to Gamaliel Britt on [employer]  being transferred to  for routine progression of patient care       Report consisted of patients Situation, Background, Assessment and   Recommendations(SBAR). Information from the following report(s) Nurse Handoff Report, Adult Overview, Surgery Report, and Cardiac Rhythm NSR  was reviewed with the receiving nurse. Lines:   Peripheral IV 12/02/22 Distal;Left;Ventral Forearm (Active)   Site Assessment Clean, dry & intact 12/02/22 0920   Line Status Brisk blood return; Infusing 12/02/22 0920   Line Care Connections checked and tightened 12/02/22 0920   Phlebitis Assessment No symptoms 12/02/22 0920   Infiltration Assessment 0 12/02/22 0920   Dressing Status New dressing applied;Clean, dry & intact 12/02/22 0920   Dressing Type Transparent 12/02/22 0920       Peripheral IV 12/02/22 Right Forearm (Active)        Opportunity for questions and clarification was provided. Patient transported with:   Tech    VTE prophylaxis orders have been written for [employer]. Patient and family given floor number and nurses name. Family updated re: pt status after security code verified.

## 2022-12-02 NOTE — OP NOTE
Right Simple mastectomy w/SLN    BREAST SURGERY OPERATIVE REPORT     Name:  Maikel Ceron  Date/Time of Admission: 2022  8:45 AM  CSN: [id number]  Attending Provider: Naga Colemanr, *  Room/Bed:  MOR/PL  : 1980  46 y.o. 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 DESCRIPTION    After proper consent was signed and patient was identified by myself, patient was taken to the OR suite and placed in the supine position. General anesthesia was applied by the anesthesia department. Patient was prepped and draped in sterile fashion. I injected 5cc of blue dye into the right breast.  Attention was first turned to the right breast.  An  incision was made using a 10 blade scalpel circumferentially around the mass. The inferior skin flap was created first with electrocautery from the sternum to the inframammary fold to the latissimus dorsi muscle. The superior skin flap was also treated with electrocautery from the sternum to the clavicle to the latissimus dorsi muscle. The breast and pectoralis major fascia removed from medial to lateral again utilizing electrocautery. The breast was marked with suture long lateral and short superior and sent to pathology for permanent section. Using the neoprobe, I was not able to identify any sentinel nodes however 2 palpable nodes were identified and obtained.      At this time the case was turned over to plastic surgery, they completed her left mastectomy and the flap/closure of the right breast. Please see their dictation for the completion     Electronically signed by: Meg Bowser DO 2022

## 2022-12-02 NOTE — PROGRESS NOTES
Spiritual Care visit. Initial Visit, Pre Surgery Consult. Visit and prayer before patient goes to surgery.     Visit by Malissa Jenkins M.Ed., Th.B. ,Staff

## 2022-12-03 VITALS
RESPIRATION RATE: 18 BRPM | DIASTOLIC BLOOD PRESSURE: 70 MMHG | HEART RATE: 78 BPM | TEMPERATURE: 97.3 F | OXYGEN SATURATION: 100 % | SYSTOLIC BLOOD PRESSURE: 104 MMHG

## 2022-12-03 LAB
ANION GAP SERPL CALC-SCNC: 4 MMOL/L (ref 2–11)
BASOPHILS # BLD: 0 K/UL (ref 0–0.2)
BASOPHILS NFR BLD: 0 % (ref 0–2)
BUN SERPL-MCNC: 13 MG/DL (ref 6–23)
CALCIUM SERPL-MCNC: 8.5 MG/DL (ref 8.3–10.4)
CHLORIDE SERPL-SCNC: 106 MMOL/L (ref 101–110)
CO2 SERPL-SCNC: 28 MMOL/L (ref 21–32)
CREAT SERPL-MCNC: 0.4 MG/DL (ref 0.6–1)
DIFFERENTIAL METHOD BLD: ABNORMAL
EOSINOPHIL # BLD: 0 K/UL (ref 0–0.8)
EOSINOPHIL NFR BLD: 0 % (ref 0.5–7.8)
ERYTHROCYTE [DISTWIDTH] IN BLOOD BY AUTOMATED COUNT: 19.1 % (ref 11.9–14.6)
GLUCOSE SERPL-MCNC: 116 MG/DL (ref 65–100)
HCT VFR BLD AUTO: 20.3 % (ref 35.8–46.3)
HCT VFR BLD AUTO: 24.5 % (ref 35.8–46.3)
HGB BLD-MCNC: 5.9 G/DL (ref 11.7–15.4)
HGB BLD-MCNC: 7.2 G/DL (ref 11.7–15.4)
HISTORY CHECK: NORMAL
IMM GRANULOCYTES # BLD AUTO: 0.3 K/UL (ref 0–0.5)
IMM GRANULOCYTES NFR BLD AUTO: 2 % (ref 0–5)
LYMPHOCYTES # BLD: 1.7 K/UL (ref 0.5–4.6)
LYMPHOCYTES NFR BLD: 11 % (ref 13–44)
MCH RBC QN AUTO: 22.3 PG (ref 26.1–32.9)
MCHC RBC AUTO-ENTMCNC: 29.1 G/DL (ref 31.4–35)
MCV RBC AUTO: 76.9 FL (ref 82–102)
MONOCYTES # BLD: 1.2 K/UL (ref 0.1–1.3)
MONOCYTES NFR BLD: 7 % (ref 4–12)
NEUTS SEG # BLD: 12.4 K/UL (ref 1.7–8.2)
NEUTS SEG NFR BLD: 80 % (ref 43–78)
NRBC # BLD: 0 K/UL (ref 0–0.2)
PLATELET # BLD AUTO: 548 K/UL (ref 150–450)
PMV BLD AUTO: 8.8 FL (ref 9.4–12.3)
POTASSIUM SERPL-SCNC: 4 MMOL/L (ref 3.5–5.1)
RBC # BLD AUTO: 2.64 M/UL (ref 4.05–5.2)
SODIUM SERPL-SCNC: 138 MMOL/L (ref 133–143)
WBC # BLD AUTO: 15.5 K/UL (ref 4.3–11.1)

## 2022-12-03 PROCEDURE — 85014 HEMATOCRIT: CPT

## 2022-12-03 PROCEDURE — 6370000000 HC RX 637 (ALT 250 FOR IP): Performed by: STUDENT IN AN ORGANIZED HEALTH CARE EDUCATION/TRAINING PROGRAM

## 2022-12-03 PROCEDURE — 36430 TRANSFUSION BLD/BLD COMPNT: CPT

## 2022-12-03 PROCEDURE — 36415 COLL VENOUS BLD VENIPUNCTURE: CPT

## 2022-12-03 PROCEDURE — P9016 RBC LEUKOCYTES REDUCED: HCPCS

## 2022-12-03 PROCEDURE — 80048 BASIC METABOLIC PNL TOTAL CA: CPT

## 2022-12-03 PROCEDURE — 85025 COMPLETE CBC W/AUTO DIFF WBC: CPT

## 2022-12-03 RX ORDER — SODIUM CHLORIDE 9 MG/ML
INJECTION, SOLUTION INTRAVENOUS PRN
Status: DISCONTINUED | OUTPATIENT
Start: 2022-12-03 | End: 2022-12-03

## 2022-12-03 RX ADMIN — ACETAMINOPHEN 650 MG: 325 TABLET ORAL at 10:38

## 2022-12-03 RX ADMIN — ACETAMINOPHEN 650 MG: 325 TABLET ORAL at 22:54

## 2022-12-03 RX ADMIN — OXYCODONE 5 MG: 5 TABLET ORAL at 10:39

## 2022-12-03 RX ADMIN — FERROUS SULFATE TAB 325 MG (65 MG ELEMENTAL FE) 325 MG: 325 (65 FE) TAB at 09:17

## 2022-12-03 RX ADMIN — FERROUS SULFATE TAB 325 MG (65 MG ELEMENTAL FE) 325 MG: 325 (65 FE) TAB at 21:04

## 2022-12-03 RX ADMIN — ACETAMINOPHEN 650 MG: 325 TABLET ORAL at 05:17

## 2022-12-03 RX ADMIN — ACETAMINOPHEN 650 MG: 325 TABLET ORAL at 15:58

## 2022-12-03 RX ADMIN — OXYCODONE 5 MG: 5 TABLET ORAL at 15:57

## 2022-12-03 ASSESSMENT — PAIN SCALES - GENERAL
PAINLEVEL_OUTOF10: 9
PAINLEVEL_OUTOF10: 3
PAINLEVEL_OUTOF10: 8
PAINLEVEL_OUTOF10: 0
PAINLEVEL_OUTOF10: 0

## 2022-12-03 ASSESSMENT — PAIN DESCRIPTION - DESCRIPTORS
DESCRIPTORS: BURNING
DESCRIPTORS: TIGHTNESS;TENDER;STABBING
DESCRIPTORS: TIGHTNESS;TENDER;STABBING

## 2022-12-03 ASSESSMENT — PAIN DESCRIPTION - LOCATION
LOCATION: LEG
LOCATION: ABDOMEN
LOCATION: CHEST;LEG
LOCATION: LEG

## 2022-12-03 ASSESSMENT — PAIN DESCRIPTION - ORIENTATION
ORIENTATION: RIGHT
ORIENTATION: RIGHT

## 2022-12-03 NOTE — PROGRESS NOTES
Communication with Zane Schreiber reporting copious amt of bleeding from L breast SHAMIKA site s/p multiple dressing changes. Order to re dress using more compression (coban) and use abdominal binder. Order implemented.

## 2022-12-03 NOTE — PROGRESS NOTES
END OF SHIFT NOTE:    Bleeding controlled s/p x2 dressing change w/ compression and abdominal binder. No bleeding noted during the night. INTAKE/OUTPUT  12/02 0701 - 12/03 0700  In: 4187 [P.O.:200; I.V.:2000]  Out: 2330 [Urine:1700; Drains:30]  Voiding: Yes  Catheter: No  Drain:   Closed/Suction Drain Inferior; Left Breast Bulb (Active)   Site Description Leaking at site 12/02/22 2300   Dressing Status New dressing applied 12/02/22 2300   Drainage Appearance Bloody 12/02/22 2300   Drain Status To bulb suction 12/02/22 2300   Output (ml) 20 ml 12/02/22 2300       Closed/Suction Drain Right Breast (Active)   Site Description Clean, dry & intact 12/02/22 2030   Dressing Status Clean, dry & intact 12/02/22 2030   Drainage Appearance Bloody 12/02/22 2030   Drain Status Other (comment) 12/02/22 2030               Flatus: Patient does have flatus present. Stool: 0 occurrences. Characteristics:                Emesis: 0 occurrences. Characteristics:        VITAL SIGNS  Patient Vitals for the past 12 hrs:   Temp Pulse Resp BP SpO2   12/02/22 2311 97.3 °F (36.3 °C) 94 16 108/80 97 %   12/02/22 1913 97.5 °F (36.4 °C) 97 16 112/75 96 %       Pain Assessment  Pain Level: 3 (12/03/22 0517)  Pain Location: Leg  Patient's Stated Pain Goal: 0 - No pain    Ambulating  Yes    Shift report given to oncoming nurse at the bedside.     Ernesto Kwok, RN

## 2022-12-03 NOTE — PROGRESS NOTES
Some bleeding overnight from Left Chest. Pressure dressing applied and bleeding appears to have resolved. Hgb this am 5.9. Will transfuse 2U PRBC's. Dr. Mayda Delacruz to see pt sometime today.        Xi Tilley NP

## 2022-12-03 NOTE — PROGRESS NOTES
Patient with profuse bleeding from SHAMIKA site on L breast/chest incision distal end. Has required several changes (x3) since admitted to floor at around 1700. SHAMIKA with small amount of bloody drainage in bulb. Site with copious continuous bloody leak from site. Patient denies any pain or discomfort at incision area. Wound vac on R side intact. Xenia Cortés NP, notified, no orders received. Nightshift RN to monitor as needed.

## 2022-12-04 VITALS
SYSTOLIC BLOOD PRESSURE: 131 MMHG | HEIGHT: 61 IN | OXYGEN SATURATION: 100 % | DIASTOLIC BLOOD PRESSURE: 81 MMHG | HEART RATE: 92 BPM | WEIGHT: 136.2 LBS | TEMPERATURE: 98.6 F | RESPIRATION RATE: 16 BRPM | BODY MASS INDEX: 25.71 KG/M2

## 2022-12-04 LAB
HCT VFR BLD AUTO: 23.1 % (ref 35.8–46.3)
HCT VFR BLD AUTO: 33 % (ref 35.8–46.3)
HGB BLD-MCNC: 10.1 G/DL (ref 11.7–15.4)
HGB BLD-MCNC: 6.7 G/DL (ref 11.7–15.4)
HISTORY CHECK: NORMAL

## 2022-12-04 PROCEDURE — 2580000003 HC RX 258: Performed by: STUDENT IN AN ORGANIZED HEALTH CARE EDUCATION/TRAINING PROGRAM

## 2022-12-04 PROCEDURE — 85014 HEMATOCRIT: CPT

## 2022-12-04 PROCEDURE — 1100000000 HC RM PRIVATE

## 2022-12-04 PROCEDURE — 36415 COLL VENOUS BLD VENIPUNCTURE: CPT

## 2022-12-04 PROCEDURE — P9016 RBC LEUKOCYTES REDUCED: HCPCS

## 2022-12-04 PROCEDURE — 6370000000 HC RX 637 (ALT 250 FOR IP): Performed by: STUDENT IN AN ORGANIZED HEALTH CARE EDUCATION/TRAINING PROGRAM

## 2022-12-04 PROCEDURE — 36430 TRANSFUSION BLD/BLD COMPNT: CPT

## 2022-12-04 RX ORDER — CEPHALEXIN 500 MG/1
500 CAPSULE ORAL 4 TIMES DAILY
Qty: 28 CAPSULE | Refills: 0 | Status: SHIPPED | OUTPATIENT
Start: 2022-12-04 | End: 2022-12-11

## 2022-12-04 RX ORDER — SENNA AND DOCUSATE SODIUM 50; 8.6 MG/1; MG/1
1 TABLET, FILM COATED ORAL 2 TIMES DAILY
Qty: 60 TABLET | Refills: 0 | Status: SHIPPED | OUTPATIENT
Start: 2022-12-04

## 2022-12-04 RX ORDER — METHOCARBAMOL 750 MG/1
750 TABLET, FILM COATED ORAL 3 TIMES DAILY PRN
Qty: 30 TABLET | Refills: 0 | Status: SHIPPED | OUTPATIENT
Start: 2022-12-04 | End: 2022-12-14

## 2022-12-04 RX ORDER — TRAMADOL HYDROCHLORIDE 50 MG/1
50 TABLET ORAL EVERY 4 HOURS PRN
Qty: 30 TABLET | Refills: 0 | Status: SHIPPED | OUTPATIENT
Start: 2022-12-04 | End: 2022-12-09

## 2022-12-04 RX ADMIN — SODIUM CHLORIDE, PRESERVATIVE FREE 10 ML: 5 INJECTION INTRAVENOUS at 08:50

## 2022-12-04 RX ADMIN — ACETAMINOPHEN 650 MG: 325 TABLET ORAL at 05:06

## 2022-12-04 RX ADMIN — FERROUS SULFATE TAB 325 MG (65 MG ELEMENTAL FE) 325 MG: 325 (65 FE) TAB at 08:49

## 2022-12-04 RX ADMIN — ACETAMINOPHEN 650 MG: 325 TABLET ORAL at 16:40

## 2022-12-04 RX ADMIN — ACETAMINOPHEN 650 MG: 325 TABLET ORAL at 10:25

## 2022-12-04 ASSESSMENT — PAIN DESCRIPTION - DESCRIPTORS
DESCRIPTORS: ACHING
DESCRIPTORS: SHARP

## 2022-12-04 ASSESSMENT — PAIN SCALES - GENERAL
PAINLEVEL_OUTOF10: 4
PAINLEVEL_OUTOF10: 4
PAINLEVEL_OUTOF10: 0

## 2022-12-04 ASSESSMENT — PAIN DESCRIPTION - ORIENTATION
ORIENTATION: RIGHT
ORIENTATION: RIGHT

## 2022-12-04 ASSESSMENT — PAIN DESCRIPTION - LOCATION
LOCATION: LEG
LOCATION: LEG

## 2022-12-04 NOTE — PROGRESS NOTES
Lab notified floor that pt hgb is 6.7     Messaged general surgery, orders received to transfuse 2u PRBC.

## 2022-12-04 NOTE — DISCHARGE INSTRUCTIONS
Keep wound vac attached and charged at right side. Examine and empty SHAMIKA drain twice daily. Record output and bring record to follow up appointment. Avoid lifting arms above head or lifting more than 5 lbs. Replace and reinforce dressing at thigh for any drainage.

## 2022-12-04 NOTE — PROGRESS NOTES
Has done well with surgery. Only taking tylenol during the day. Has voided. No BM. Had 1 unit blood yesterday two this AM with good response no side effects. Ready to go home    Right chest dressing in place, cleean and dry. Good seal on vac. Left chest dressing. Dry without strikethrough. Right thigh with serous drainage. Drain serous. FROM JULIETH. No numbness. Doing well. Will discharge home with wound vac and SHAMIKA drains and close follow up.

## 2022-12-04 NOTE — CARE COORDINATION
CM met with patient to complete initial assessment. Patient states that she will be staying with her mother after discharge. Address is [address], [address] (address updated in patient's chart). Patient is independent with ADL's, drives, and works full time for [employer]. No DME's used. Demographics, insurance and PCP confirmed. Patient currently has a wound vac that surgery provided to her. She will be discharging home with that wound vac. Patient and mother at bedside agreed to patient having [facility] and asked that referral be sent to [facility]. Order placed for RN, referral sent. Mother will be transporting patient home after wound vac charges in the room.       12/04/22 2644   Service Assessment   Patient Orientation Alert and Oriented   Cognition Alert   History Provided By Medical Record   Support Systems Parent   Prior Functional Level Independent in ADLs/IADLs   Current Functional Level Independent in ADLs/IADLs   Can patient return to prior living arrangement Yes

## 2022-12-04 NOTE — PROGRESS NOTES
Admit Date: 2022    POD 2 Days Post-Op    Procedure:  Procedure(s):  BREAST MASTECTOMY  BILATERAL MASTECTOMY WITH RIGHT SENTINEL NODE EXCISION  Pre-Op: 8:00, Lympho: 9:30, Loc: n/a, Surgery: 1:30  TOTAL BILATERAL BREAST MASTECTOMY  MUSCLE FLAP CLOSURE W/ SPLIT  THICKNESS SKIN GRAFT FROM LEG TO CHEST    Subjective:     Patient is awake in bed. Wants to go home today. Hgb this am 6.7. Pt only received 1 of the 2 U of PRBC that were ordered yesterday. Two units of PRBC ordered for today. Tolerating diet. Pain controlled. No active bleeding from post operative sites. AF, NAD. Objective:       Vitals:    22 0533 22 0803 22 0811 22 1019   BP: 134/84 125/85 126/89 131/81   Pulse: 85 85 85 92   Resp: 19 18 18 16   Temp: 98.1 °F (36.7 °C) 98.2 °F (36.8 °C) 98.2 °F (36.8 °C) 98.6 °F (37 °C)   TempSrc: Oral Oral Oral    SpO2: 98% 100% 99% 100%   Weight:       Height:           Temp (24hrs), Av °F (36.7 °C), Min:97.7 °F (36.5 °C), Max:98.6 °F (37 °C)  . I&O reviewed as documented. [unfilled]     Physical Exam:   Constitutional: Alert, oriented, cooperative patient in no acute distress; appears stated age   /83   Pulse 92   Temp 98.6 °F (37 °C)   Resp 16   Ht 5' 1\" (1.549 m)   Wt 136 lb 3.2 oz (61.8 kg)   SpO2 100%   BMI 25.73 kg/m²   Eyes: Sclera are clear. EOMs intact  ENMT: no external lesions' gross hearing normal; no obvious neck masses, no ear or lip lesions, nares normal  CV: RRR. Normal perfusion  Integument: No active bleeding from post operative sites. Dressings c/d/I. Wound vac to Right chest in place and functioning properly. Drains with serous output. Resp: No JVD. Breathing is  non-labored; no audible wheezing. GI: soft and non-distended     Musculoskeletal: No embolic signs or cyanosis.    Neuro:  Oriented; moves all 4; no focal deficits  Psychiatric: normal affect and mood, no memory impairment     Labs:   Recent Results (from the past 24 hour(s))   Hemoglobin and Hematocrit    Collection Time: 12/03/22  8:29 PM   Result Value Ref Range    Hemoglobin 7.2 (L) 11.7 - 15.4 g/dL    Hematocrit 24.5 (L) 35.8 - 46.3 %   Hemoglobin and Hematocrit    Collection Time: 12/04/22  3:15 AM   Result Value Ref Range    Hemoglobin 6.7 (LL) 11.7 - 15.4 g/dL    Hematocrit 23.1 (L) 35.8 - 46.3 %   PREPARE RBC (CROSSMATCH), 2 Units    Collection Time: 12/04/22  4:15 AM   Result Value Ref Range    History Check Historical check performed    Hemoglobin and Hematocrit    Collection Time: 12/04/22 10:57 AM   Result Value Ref Range    Hemoglobin 10.1 (L) 11.7 - 15.4 g/dL    Hematocrit 33.0 (L) 35.8 - 46.3 %       All Data Reviewed    XR Results (most recent):  @Scoot NetworksHSILASTIMGCAT([id number]:1)@    Results (most recent):  @BSNew.netILASTIMGCAT([id number]:1)@   Assessment:     Principal Problem:    Malignant neoplasm of right breast (HCC)  Active Problems:    Breast mass, right  Resolved Problems:    * No resolved hospital problems.  *        Plan/Recommendations/Medical Decision Making:     Regular diet  Pain/ nausea control  1U PRBC - 12/3/22  2U PRBC - 12/4/22  Wound vac in place  Possibly home later today    Abundio Cardona NP

## 2022-12-04 NOTE — PROGRESS NOTES
END OF SHIFT NOTE:    Pt mini wound vac interchanged for hospital wound vac. No charging cord sent with pt from OR and device had low battery. INTAKE/OUTPUT  12/03 0701 - 12/04 0700  In: 877.5 [P.O.:500]  Out: 7320 [Urine:2400; Drains:70]  Voiding: Yes  Catheter: No  Drain:   Closed/Suction Drain Inferior; Left Breast Bulb (Active)   Site Description Clean, dry & intact 12/04/22 0407   Dressing Status Clean, dry & intact 12/04/22 0407   Drainage Appearance Bloody 12/04/22 0407   Drain Status Compressed; To bulb suction 12/04/22 0407   Output (ml) 10 ml 12/04/22 0407       Closed/Suction Drain Right Breast (Active)   Site Description Clean, dry & intact 12/04/22 0407   Dressing Status Clean, dry & intact 12/04/22 0407   Drainage Appearance Serosanguinous 12/04/22 0407   Drain Status Other (comment) 12/03/22 1945   Output (ml) 0 ml 12/04/22 0407               Flatus: Patient does have flatus present. Stool: 0 occurrences. Characteristics:                Emesis: 0  occurrences. Characteristics:        VITAL SIGNS  Patient Vitals for the past 12 hrs:   Temp Pulse Resp BP SpO2   12/04/22 0533 98.1 °F (36.7 °C) 85 19 134/84 98 %   12/04/22 0514 97.8 °F (36.6 °C) 83 19 130/81 98 %   12/04/22 0300 97.8 °F (36.6 °C) 91 19 116/80 98 %   12/03/22 2259 97.7 °F (36.5 °C) 78 19 128/89 100 %   12/03/22 1940 97.9 °F (36.6 °C) 88 19 117/69 97 %       Pain Assessment  Pain Level: 0 (12/04/22 0407)  Pain Location: Chest, Leg  Patient's Stated Pain Goal: 0 - No pain    Ambulating  Yes    Shift report given to oncoming nurse at the bedside.     Stacia Florence RN

## 2022-12-05 ENCOUNTER — HOME HEALTH ADMISSION (OUTPATIENT)
Dept: HOME HEALTH SERVICES | Facility: HOME HEALTH | Age: 42
End: 2022-12-05

## 2022-12-05 NOTE — CARE COORDINATION
CM received phone call from [facility] with [facility] who states that they do not have any availability for nursing this week. JUAN then called patient's mother (as she was the one that helped pick the agency for [place], LVM to call CM back). JUAN then called patient. She agreed to [facility] sending referral to [facility]. Referral sent via [address]. Abe Martinez (liaison with [facility]) confirmed that they service the [facility]. She also is aware that referral has been sent.

## 2022-12-06 LAB
ABO + RH BLD: NORMAL
BLD PROD TYP BPU: NORMAL
BLOOD BANK DISPENSE STATUS: NORMAL
BLOOD GROUP ANTIBODIES SERPL: NORMAL
BPU ID: NORMAL
CROSSMATCH RESULT: NORMAL
SPECIMEN EXP DATE BLD: NORMAL
UNIT DIVISION: 0

## 2022-12-14 NOTE — OP NOTE
Operative Note      Patient: Eve Mir  YOB: 1980  MRN: [id number]    Date of Procedure: 12/2/2022    Pre-Op Diagnosis: Malignant neoplasm of right breast ([facility] [address] [C50.911]    Post-Op Diagnosis: Same       Procedure:  Left breast simple mastectomy  Right chest wall reconstruction with split thickness skin graft 5 x 6 cm and complex closure 15 cm. Surgeon(s):  DO Garfield Almanzar MD    Assistant:   * No surgical staff found *    Anesthesia: General    Estimated Blood Loss (mL): 230 ml    Complications: None immediate    Specimens:   ID Type Source Tests Collected by Time Destination   A : left breast  Tissue Breast SURGICAL PATHOLOGY Natasha Nj DO 12/2/2022 1321    B : right breast Tissue Breast SURGICAL PATHOLOGY Natasha Nj DO 12/2/2022 1402    C : right axillary lymph node Tissue Lymph Node SURGICAL PATHOLOGY Natasha Nj DO 12/2/2022 1421        Implants:  * No implants in log *      Drains:   [REMOVED] Closed/Suction Drain Inferior; Left Breast Bulb (Removed)   Site Description Clean, dry & intact 12/04/22 0751   Dressing Status Clean, dry & intact 12/04/22 0751   Drainage Appearance Bloody 12/04/22 0751   Drain Status To bulb suction 12/04/22 0751   Output (ml) 10 ml 12/04/22 0407       [REMOVED] Closed/Suction Drain Right Breast (Removed)   Site Description Clean, dry & intact 12/04/22 0751   Dressing Status Clean, dry & intact 12/04/22 0751   Drainage Appearance Bloody 12/04/22 0751   Drain Status Other (comment) 12/04/22 0751   Output (ml) 0 ml 12/04/22 0407       [REMOVED] Urinary Catheter 12/02/22 2 Way (Removed)       Detailed Description of Procedure:     Prior to the procedure the patient was met in holding.  Once again a discussion of the risks, benefits and alternatives was conducted including but not limited to bleeding, infection, failure of the surgery, need for further procedures, disappointing or unacceptable cosmesis, damage to adjacent structures was conducted. The patient again affirmed understanding and consent. The patient was marked in the upright and relaxed position. Patient brought to the OR, surgical timeout performed and anesthesia induced. Patient positioned and prepped and draped. Surgery performed in coordination with Dr Lorrie Calvo simultaneously. Two separate fields maintained throughout the ablative portion of the procedure. Attention turned to the left breast. Lenticular incision made sharply and carried down to the breast capsule. Superior flap developed elevating skin and subcutaneous fat to the level of the clavicle. Similar dissection performed inferiorly to the inframammary fold, medially to the medial pectoral border and laterally to the lateral pectoral border. Axillary tail similarly dissected. No overt pathology noted. Breast tissue excised along with pectoral fascia and passed off field marked short suture superior, long suture lateral.     Wound irrigated and hemostasis obtained. 10 flat HECTOR drain placed and brought out laterally. Secured with silk. Closure completed with fat fascial 3-0 vicryl, deep dermal insorb and running intracuticular stratafix. Attention then turned to closure of the right side. Healthy skin undermined 5 cm in all directions and purse string 2-0 PDS placed circumferentially to advance skin and limit the size of the skin graft. Recipient area measured and transferred to the thigh. Pneumatic dermatome used to harvest 12/1000 skin graft after instillation of tumescence. Donor site dressed with xeroform. Graft meshed 1:1.5 and inset with staples. 10 flat hector drain placed in the axilla. Prevena wound vac placed over the skin graft and secured with ioban. Formal breast dressing applied.

## 2022-12-22 ENCOUNTER — OFFICE VISIT (OUTPATIENT)
Dept: SURGERY | Age: 42
End: 2022-12-22

## 2022-12-22 ENCOUNTER — CLINICAL DOCUMENTATION (OUTPATIENT)
Dept: CASE MANAGEMENT | Age: 42
End: 2022-12-22

## 2022-12-22 VITALS — HEIGHT: 61 IN | BODY MASS INDEX: 25.68 KG/M2 | WEIGHT: 136 LBS

## 2022-12-22 DIAGNOSIS — Z09 POSTOPERATIVE EXAMINATION: Primary | ICD-10-CM

## 2022-12-22 PROCEDURE — 99024 POSTOP FOLLOW-UP VISIT: CPT | Performed by: STUDENT IN AN ORGANIZED HEALTH CARE EDUCATION/TRAINING PROGRAM

## 2022-12-22 RX ORDER — GABAPENTIN 100 MG/1
100 CAPSULE ORAL 3 TIMES DAILY PRN
Qty: 30 CAPSULE | Refills: 1 | Status: SHIPPED | OUTPATIENT
Start: 2022-12-22 | End: 2023-01-01
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
## 2022-12-28 ENCOUNTER — CLINICAL DOCUMENTATION (OUTPATIENT)
Dept: ONCOLOGY | Age: 42
End: 2022-12-28

## 2022-12-28 NOTE — PROGRESS NOTES
Called to check on status of Lakisha's saliva sample for genetic testing. She did not answer so I left a voicemail asking for her to send in the kit within the next week, or to give me a call if she needed a new kit, had questions, or did not want testing. Left my number twice. Let her know that if we did not get the kit sent in within 6 months of ordering that testing, that the testing would be cancelled.
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
## 2023-01-04 ENCOUNTER — CLINICAL DOCUMENTATION (OUTPATIENT)
Dept: ONCOLOGY | Age: 43
End: 2023-01-04

## 2023-01-04 ENCOUNTER — OFFICE VISIT (OUTPATIENT)
Dept: ONCOLOGY | Age: 43
End: 2023-01-04
Payer: COMMERCIAL

## 2023-01-04 VITALS
OXYGEN SATURATION: 100 % | DIASTOLIC BLOOD PRESSURE: 102 MMHG | TEMPERATURE: 98.2 F | HEART RATE: 114 BPM | HEIGHT: 62 IN | RESPIRATION RATE: 16 BRPM | BODY MASS INDEX: 23.55 KG/M2 | WEIGHT: 128 LBS | SYSTOLIC BLOOD PRESSURE: 164 MMHG

## 2023-01-04 DIAGNOSIS — C77.3 MALIGNANT NEOPLASM METASTATIC TO LYMPH NODE OF AXILLA (HCC): ICD-10-CM

## 2023-01-04 DIAGNOSIS — J98.59 MEDIASTINAL MASS: ICD-10-CM

## 2023-01-04 DIAGNOSIS — D50.0 ANEMIA DUE TO BLOOD LOSS: ICD-10-CM

## 2023-01-04 DIAGNOSIS — C50.911 MALIGNANT PHYLLODES TUMOR OF BREAST, RIGHT (HCC): Primary | ICD-10-CM

## 2023-01-04 PROCEDURE — 99205 OFFICE O/P NEW HI 60 MIN: CPT | Performed by: INTERNAL MEDICINE

## 2023-01-04 NOTE — PROGRESS NOTES
Left [facility] a voicemail informing her that the saliva sample she sent in for genetic testing failed. She will have to send in a new saliva sample. I sent a new kit to her house and told her to call me if she has any questions or wants to switch to a blood draw.

## 2023-01-04 NOTE — PATIENT INSTRUCTIONS
Patient Instructions from Today's Visit    Reason for Visit:  Follow up Breast Cancer    Diagnosis Information:  [email or web address]    Plan:  Discussed surgery, radiation  Referral to Radiation Oncology in [place]    Follow Up: Follow up in 3-4 weeks with labs prior    Recent Lab Results:  N/A     Treatment Summary has been discussed and given to patient: n/a    -------------------------------------------------------------------------------------------------------------------  Please call our office at [phone] if you have any  of the following symptoms:   Fever of 100.5 or greater  Chills  Shortness of breath  Swelling or pain in one leg    After office hours an answering service is available and will contact a provider for emergencies or if you are experiencing any of the above symptoms. Patient does express an interest in My Chart. My Chart log in information explained on the after visit summary printout at the [place] [id number] desk. Mellissa Cruz RN       Doxorubicin        (doks oh MICHAEL bi sin)    Trade Names: Adriamycin ®, Rubex®    Doxorubicin is the generic name for the trade name drug, Adriamycin®, as well as, Rubex®. In some cases health care professionals may use the trade names Adriamycin® or Rubex® when referring to the generic drug name Doxorubicin. Drug Type:    Doxorubicin is an anti-cancer (antineoplastic or cytotoxic) chemotherapy drug. Doxorubicin is classified as an anthracycline antibiotic.  (For more detail, see How Doxorubicin Works section below).     What Doxorubicin Is Used For:  Acute lymphoblastic leukemia (ALL)  Acute myeloblastic leukemia (AML)  Bone sarcoma  Breast cancer  Endometrial cancer  Gastric cancer  Head and neck cancer  Hodgkin lymphoma  Non-Hodgkin lymphoma  Liver cancer  Kidney cancer  Multiple myeloma  Neuroblastoma  Ovarian cancer  Small Cell Lung cancer  Soft tissue sarcoma  Thyomas  Thyroid cancer  Transitional cell bladder cancer  Uterine sarcoma  Wilms tumor  Waldenström macroglobulinemia  Note: If a drug has been approved for one use, physicians sometimes elect to use this same drug for other problems if they believe it might be helpful. How Doxorubicin Is Given:  Doxorubicin is administered via an intravenous (IV) injection through a central line or a peripheral venous line, and the drug is given over several minutes. Doxorubicin can also be given by continuous infusion through a central catheter line. There is no pill form of Doxorubicin. Doxorubicin is a vesicant. A vesicant is a chemical that causes extensive tissue damage and blistering if it escapes from the vein. The nurse or doctor who gives Doxorubicin must be carefully trained. If you notice redness or swelling at the IV site while you are receiving Doxorubicin, alert your health care provider immediately. The amount of Doxorubicin you will receive depends on many factors, including your height and weight, your general health or other health problems, and the type of cancer you have. Your doctor will determine your exact dosage and schedule. Side Effects:  Important things to remember about the side effects of Doxorubicin:    Most people will not experience all of the side effects listed. Doxorubicins side effects are often predictable in terms of their onset, duration, and severity. Doxorubicins side effects will improve after therapy is complete. Doxorubicins side effects may be quite manageable. There are many options to minimize or prevent the side effects of Doxorubicin  The following side effects are common (occurring in greater than 30%) for patients taking Doxorubicin:    Pain along the site where the medication was given  Nausea or vomiting (You will be pretreated for this side effect)Later Side Effects: (within two weeks after treatment begins)  Low blood counts .  Your white and red blood cells and platelets may temporarily decrease. This can put you at increased risk for infection, anemia and/or bleeding. § Hilary: Meaning low point, hilary is the point in time between chemotherapy cycles in which you experience low blood counts. Hilary: 10-14 days  Recovery: 21-28 days    Hair loss on the scalp or elsewhere on the body (called alopecia). Most patients do lose some or all of their hair during their treatment. But your hair will grow back after treatment is completed. The following side effects are less common (occurring in 10-29%) for patients taking Doxorubicin:    Eyes watering  Mouth sores  Urine may appear red, red-brown, orange or pink from the color of the medication for one to two days after you receive a dose. Darkening of the nail beds. Darkening of the skin where previous radiation treatment has been given. Problems with fertility - ability to bear children (occurs in about 10% of both men and women - this should be discussed with our doctor prior to therapy). A serious, but uncommon side effect of Doxorubicin is a decrease in the hearts pumping capability. Therefore, there is a lifetime maximum on the amount of doxorubicin you can receive. This lifetime maximum dose may be lower if you have heart disease risk factors such as radiation to the chest, advancing age, and use of other heart-toxic drugs. Your doctor will check your heart function (with an ECHO test) before you may take any Doxorubicin and will monitor your heart closely during your treatment. Dose-related heart problems can occur as late as 7 or 8 years after treatments have ended. Another rare, yet serious risk that is associated with doxorubicin therapy is the risk of developing a blood cancer, such as leukemia, up to years after taking the drug. Talk to your doctor about this risk. Tumor lysis syndrome may occur as a result of treatment with Doxorubicin.  Tumor lysis syndrome occurs when large amounts of cancerous cells are rapidly killed by the therapy. Tumor lysis syndrome can lead to kidney failure and other sudden health problems. Tumor lysis syndrome usually occurs within 24 - 48 hours of therapy. Your health care provider may prescribe intravenous fluids and medications to prevent this complication. Your health care provider will monitor your progress carefully during therapy    Not all side effects are listed above. Side effects that are very rare -- occurring in less than about 10 percent of patients -- are not listed here. But you should always inform your health care provider if you experience any unusual symptoms. When to contact your doctor or health care provider:  Contact your health care provider immediately, day or night, if you should experience any of the following symptoms:    Fever of 100.4° F (38° C), chills (possible signs of infection)  Blistering at the IV site  Shortness of breath, wheezing, difficulty breathing, closing up of the throat, swelling of facial features, hives (possible allergic reaction). The following symptoms require medical attention, but are not emergency situations. Contact your health care provider within 24 hours of noticing any of the following:    Mouth sores (painful redness, swelling or ulcers)  Nausea (interferes with ability to eat and unrelieved with prescribed medication)  Vomiting (vomiting more than 4-5 times in a 24 hour period)  Diarrhea (4-6 episodes in a 24-hour period)  Fast or irregular heart beats  Unusual bleeding or bruising  Black or tarry stools, or blood in your stools or urine  Extreme fatigue (unable to carry on self-care activities)  Swelling of the feet or ankles  Precautions:  Before starting Doxorubicin treatment, make sure you tell your doctor about any other medications you are taking (including over-the-counter, vitamins, or herbal remedies). Do not take aspirin or products containing aspirin unless your doctor permits this.   Do not receive any kind of vaccination without your doctors approval while taking Doxorubicin. Inform your health care professional if you are pregnant or may be pregnant prior to starting this treatment. Pregnancy category D, Doxorubicin may be hazardous to the fetus. Women who are pregnant or become pregnant must be advised of the potential hazard to the fetus. For both men and women: Use contraceptives, and do not conceive a child (get pregnant) while taking Doxorubicin. Barrier methods of contraception, such as condoms, are recommended while on Doxorubicin and for 6 months following therapy. Discuss with your doctor if you are considering pregnancy. Do not breast feed while taking Doxorubicin. People with congestive heart failure, those who have already had high doses of Doxorubicin or a similar drug, and those with permanent problems with blood counts (bone marrow suppression) cannot receive Doxorubicin. Self-Care Tips:  Apply ice if you have any pain, redness or swelling at the IV site, and notify your doctor. You may be at risk of infection so try to avoid crowds or people with colds, and report fever or any other signs of infection immediately to your health care provider. Wash your hands often, to avoid infection  To reduce nausea, take anti-nausea medications as prescribed by your doctor, and eat small, frequent meals. To help treat/prevent mouth sores, use a soft toothbrush, and rinse three times a day with 1 teaspoon of baking soda mixed with 8 ounces of water. Use an electric razor and a soft toothbrush to minimize bleeding. Avoid contact sports or activities that could cause injury. Avoid sun exposure. Wear SPF 27 (or higher) sunblock and protective clothing. Drink two to three quarts of fluid every 24 hours, unless you are instructed otherwise. Get plenty of rest.  Maintain good nutrition. In general, drinking alcoholic beverages should be minimized or avoided.  You should discuss this with your doctor. Use care to keep body fluids from coming in contact with family members or caregivers. Wash soiled clothes immediately and use gloves when touching body fluids for at least 5 days. · Remain active as you are able. Gentle exercise is encouraged such as a daily walk. · If you experience symptoms or side effects, be sure to discuss them with your health care team. They can prescribe medications and/or offer other suggestions that are effective in managing such problems. Monitoring and Testing  You will be checked regularly by your doctor while you are taking Doxorubicin, to monitor side effects and check your response to therapy. A baseline heart evaluation is recommended before starting treatment, usually a MUGA or ECHO. A full blood count will be done regularly, and heart function tests will be done as your doctor prescribes. Various tests to monitor the function of other organs (such as your kidneys and liver) will also be ordered by your physician. How Doxorubicin Works:  Cancerous tumors are characterized by cell division, which is no longer controlled as it is in normal tissue. Normal cells stop dividing when they come into contact with like cells, a mechanism known as contact inhibition. Cancerous cells lose this ability. Cancer cells no longer have the normal checks and balances in place that control and limit cell division. The process of cell division, in both normal and cancerous cells, is through the cell cycle. The cell cycle goes from the resting phase, through active growing phases, and then to mitosis (division). The ability of chemotherapy to kill cancer cells depends on its ability to halt cell division. Usually, the drugs work by damaging the RNA or DNA that tells the cell how to copy itself in division. If the cells are unable to divide, they die. The faster the cells are dividing, the more likely it is that chemotherapy will kill the cells, causing the tumor to shrink.  They also induce cell suicide (self-death or apoptosis). Chemotherapy drugs that affect cells only when they are dividing are called cell-cycle specific. Chemotherapy drugs that affect cells when they are at rest are called cell-cycle non-specific. The scheduling of chemotherapy is set based on the type of cells, rate at which they divide, and the time at which a given drug is likely to be effective. This is why chemotherapy is typically given in cycles. Unfortunately, chemotherapy does not know the difference between the cancerous cells and the normal cells. Chemotherapy will kill all cells that are rapidly dividing. The normal cells will grow back healthy, but in the meantime, side effects occur. The normal cells most commonly affected by chemotherapy are blood cells; cells in the mouth, stomach and bowel, and hair follicles; resulting in low blood counts, mouth sores, nausea, diarrhea, and/or hair loss. Different drugs may affect different parts of the body. Doxorubicin is classified as an antitumor antibiotic. Antitumor antibiotics are made from natural products produced by species of the soil fungus Streptomyces. These drugs act during multiple phases of the cell cycle and are considered cell-cycle specific. There are several types of antitumor antibiotics:    Note: We strongly encourage you to talk with your health care professional about your specific medical condition and treatments. The information contained in this website is meant to be helpful and educational, but is not a substitute for medical advice. Ender Nieto
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
## 2023-01-06 ENCOUNTER — CLINICAL DOCUMENTATION (OUTPATIENT)
Dept: ONCOLOGY | Age: 43
End: 2023-01-06

## 2023-01-06 NOTE — PROGRESS NOTES
Called pt. No answer. Lvm for pt about appointment on Monday 1-9. Per MR needs to come in on Tuesday 1-10. I left the new appointment date/time on her vm and asked for cb to confirm and/or change appointment if needed.  KO

## 2023-01-09 ENCOUNTER — TELEPHONE (OUTPATIENT)
Dept: INTERNAL MEDICINE CLINIC | Facility: CLINIC | Age: 43
End: 2023-01-09

## 2023-01-09 NOTE — TELEPHONE ENCOUNTER
----- Message from Bhupinder Ayala MD sent at 1/9/2023  9:12 AM EST -----  Regarding: FW: Meet to discuss oncology plan and concerns   Please schedule whenever I have an opening. Thanks!    ----- Message -----  From: Quincy Kilgore MA  Sent: 1/9/2023   9:02 AM EST  To: Bhupinder Ayala MD  Subject: FW: Meet to discuss oncology plan and concer#      ----- Message -----  From: Katie Israel  Sent: 1/8/2023   6:43 PM EST  To: , #  Subject: Meet to discuss oncology plan and concerns       Dr Justine Flores,     I have submitted an appointment request. Would you be willing to meet to discuss what oncologys plan and my concerns? I would like to get a second opinion and talk with you about this. I am back at work teaching. Any appointment after 3:30 would save a sick day for me in anticipation I will need days in the future for possibly treatments. Thank you for your time and I value your opinion. I hope all is going well and look forward to meeting with you.      Slava Lazaro

## 2023-01-09 NOTE — TELEPHONE ENCOUNTER
Did not like oncologist she went to wants another referral and wanted to make sure if that was the soonest appointment ?    If it can be sooner that would be great, but 1/20 is good for now too it is still before her next appointment

## 2023-01-10 ENCOUNTER — CLINICAL DOCUMENTATION (OUTPATIENT)
Dept: ONCOLOGY | Age: 43
End: 2023-01-10

## 2023-01-10 NOTE — PROGRESS NOTES
Faxed referral to [facility] and received confirmation.  LANETTE    F - [phone]  P - [phone] Opt 1

## 2023-01-10 NOTE — PROGRESS NOTES
Faxed referral to rad onc in [place], Dr Suzanne Wiley, and received confirmation.  LANETTE    F - [phone]  P - [phone]

## 2023-01-20 ENCOUNTER — OFFICE VISIT (OUTPATIENT)
Dept: INTERNAL MEDICINE CLINIC | Facility: CLINIC | Age: 43
End: 2023-01-20
Payer: COMMERCIAL

## 2023-01-20 VITALS
TEMPERATURE: 98.8 F | WEIGHT: 134.2 LBS | HEIGHT: 62 IN | DIASTOLIC BLOOD PRESSURE: 95 MMHG | OXYGEN SATURATION: 100 % | SYSTOLIC BLOOD PRESSURE: 151 MMHG | HEART RATE: 107 BPM | BODY MASS INDEX: 24.69 KG/M2

## 2023-01-20 DIAGNOSIS — Z71.3 ENCOUNTER FOR NUTRITIONAL COUNSELING: ICD-10-CM

## 2023-01-20 DIAGNOSIS — C50.911 MALIGNANT NEOPLASM OF RIGHT FEMALE BREAST, UNSPECIFIED ESTROGEN RECEPTOR STATUS, UNSPECIFIED SITE OF BREAST (HCC): Primary | ICD-10-CM

## 2023-01-20 PROCEDURE — 99215 OFFICE O/P EST HI 40 MIN: CPT | Performed by: INTERNAL MEDICINE

## 2023-01-20 ASSESSMENT — PATIENT HEALTH QUESTIONNAIRE - PHQ9
1. LITTLE INTEREST OR PLEASURE IN DOING THINGS: 0
SUM OF ALL RESPONSES TO PHQ QUESTIONS 1-9: 0
SUM OF ALL RESPONSES TO PHQ QUESTIONS 1-9: 0
SUM OF ALL RESPONSES TO PHQ9 QUESTIONS 1 & 2: 0
2. FEELING DOWN, DEPRESSED OR HOPELESS: 0
SUM OF ALL RESPONSES TO PHQ QUESTIONS 1-9: 0
SUM OF ALL RESPONSES TO PHQ QUESTIONS 1-9: 0

## 2023-01-20 NOTE — PROGRESS NOTES
Chief Complaint   Patient presents with    Follow-up     2 month follow-up for mass of right breast. Pt would like to discuss treatment options and get a second opinion from Dr. Avel Montgomery. HPI  The patient comes to the clinic for a regular follow-up. She is quite content with the care she has received so far regarding her recently diagnosed breast cancer. Eden Leo went through bilateral mastectomy with right sentinel node biopsy and right chest wall reconstruction. Recently evaluated by oncology specialist, she would like to request a referral to a different provider for a second opinion. At this time, she denies any acute signs or symptoms; in fact, she feels excellent as compared to a few months ago. Reviewed and updated this visit by provider:  Tobacco  Allergies  Meds  Problems  Med Hx  Surg Hx  Fam Hx       Review of Systems   Constitutional:  Negative for chills and fever. Respiratory:  Negative for cough and shortness of breath. Cardiovascular:  Negative for chest pain and palpitations. Gastrointestinal:  Negative for constipation, diarrhea and vomiting. Genitourinary:  Negative for frequency and urgency. Visit Vitals  BP (!) 151/95 (Site: Left Upper Arm, Position: Sitting, Cuff Size: Medium Adult)   Pulse (!) 107   Temp 98.8 °F (37.1 °C) (Temporal)   Ht 5' 2\" (1.575 m)   Wt 134 lb 3.2 oz (60.9 kg)   SpO2 100%   BMI 24.55 kg/m²     Physical Exam  Constitutional:       General: She is not in acute distress. Appearance: Normal appearance. She is not ill-appearing. Eyes:      Extraocular Movements: Extraocular movements intact. Conjunctiva/sclera: Conjunctivae normal.   Pulmonary:      Effort: Pulmonary effort is normal.   Musculoskeletal:         General: Normal range of motion. Cervical back: Normal range of motion. No rigidity. Skin:     General: Skin is warm and dry. Comments: No signs of infection at the postoperative wound at the right chest area. Neurological:      General: No focal deficit present. Mental Status: She is alert. Mental status is at baseline. Psychiatric:         Mood and Affect: Mood normal.         Behavior: Behavior normal.         Thought Content: Thought content normal.         Judgment: Judgment normal.     Assessment/Plan:  1. Malignant neoplasm of right female breast, unspecified estrogen receptor status, unspecified site of breast ([facility] [address]  Overview:  Malignant phyllodes tumor, measuring 32 cm in greatest dimension with margins negative for tumor and benign skin and nipple. One of two right axillary lymph nodes was positive for metastatic phyllodes tumor (1/2). Assessment & Plan:  Appreciate all specialists involved in Ms. Booth Cos care. Patient requested a second opinion regarding management options moving forward. Orders:  -     External Referral To Oncology  2. Encounter for nutritional counseling  Today, we reviewed the multiple benefits of a healthy lifestyle, which includes proper nutrition and sufficient physical exercise, among other aspects. The patient was quite grateful for the time spent with her PCP and the encouragement provided in order to improve her chances of positive health outcomes. On this date 1/20/2023 I have spent 60 minutes on patient care-related activities, including prior record review, laboratory work interpretation, the face to face encounter, history taking, physical exam and discussing the diagnoses and importance of compliance with the treatment plan as well as documenting on the day of the visit.     Shoshana Reynolds MD

## 2023-01-21 PROBLEM — N63.10 BREAST MASS, RIGHT: Status: RESOLVED | Noted: 2022-12-02 | Resolved: 2023-01-21

## 2023-01-21 PROBLEM — N63.15 MASS OVERLAPPING MULTIPLE QUADRANTS OF RIGHT BREAST: Status: RESOLVED | Noted: 2022-11-09 | Resolved: 2023-01-21

## 2023-01-21 ASSESSMENT — ENCOUNTER SYMPTOMS
COUGH: 0
SHORTNESS OF BREATH: 0
CONSTIPATION: 0
DIARRHEA: 0
VOMITING: 0

## 2023-01-21 NOTE — ASSESSMENT & PLAN NOTE
Appreciate all specialists involved in Ms. Jayda [place] care. Patient requested a second opinion regarding management options moving forward.

## 2023-02-01 DIAGNOSIS — C50.911 MALIGNANT PHYLLODES TUMOR OF BREAST, RIGHT (HCC): Primary | ICD-10-CM

## 2023-02-06 ENCOUNTER — CLINICAL DOCUMENTATION (OUTPATIENT)
Dept: ONCOLOGY | Age: 43
End: 2023-02-06

## 2023-02-06 NOTE — PROGRESS NOTES
Left Jerzy Alon a voicemail on 2/6/2023 to have her call me back to discuss her genetic testing results.

## 2023-02-08 ENCOUNTER — CLINICAL DOCUMENTATION (OUTPATIENT)
Dept: ONCOLOGY | Age: 43
End: 2023-02-08

## 2023-02-08 NOTE — PROGRESS NOTES
Date: 2/8/2023    Patient: Daksha Lam  YOB: 1980    Provider: Manjit Padilla, MS Mirtha Ortiz, you were previously referred by Cindy Fish DO for an initial genetic consultation due to your personal history of breast cancer. You were seen on 11/22/2022 and consented to genetic testing, which was sent to [facility]. I called you on 2/8/2023 to discuss the results of this testing. This letter is a summary of the results. Results  During our initial meeting, we reviewed your personal and family history. Following discussion of your  history we discussed the benefits, limitations and possible impacts of genetic testing and you pursued a panel called CancerNext-Expanded looking at 77 genes in which pathogenic variants (harmful genetic differences) have been related to increased risk for cancer. The genes included in this panel were AIP, ALK, APC, HUGO, AXIN2, BAP1, BARD1, BLM, BMPR1A, BRCA1, BRCA2, BRIP1, CDC73, CDH1, CDK4, CDKN1B, CDKN2A, CHEK2, CTNNA1, DICER1, FANCC, FH, FLCN, GALNT12, KIF1B, LZTR1, MAX, MEN1, MET, MLH1, MSH2, MSH3, MSH6, MUTYH, NBN, NF1, NF2, NTHL1, PALB2, PHOX2B, PMS2, POT1, CDEKT4C, PTCH1, PTEN, RAD51C, RAD51D, RB1, RECQL, RET, SDHA, SDHAF2, SDHB, SDHC, SDHD, SMAD4, SMARCA4, SMARCB1, SMARCE1, STK11, SUFU, HNYM411, TP53, TSC1, TSC2, VHL, XRCC2, EGFR, EGLN1, HOXB13, KIT, MITF, PDGFRA, POLD1, POLE,  EPCAM and GREM1. This testing came back negative, meaning we did not identify a pathogenic variant that increases risk of developing breast cancer. During our initial meeting we reviewed the potential reasons for a negative test result, and these include: There is not a pathogenic variant in your family that increases risk of developing cancer, and all the cases of cancer in your family have been sporadic, or by chance.   There is a pathogenic variant in your family that increases the risk of developing cancer, but you did not inherit this variant. There is a pathogenic variant in your family that increases the risk of developing cancer but it is located in a gene that was not tested, or an untestable area of a gene. Considering these various reasons for a negative result, we suggest you check back in with us should there be any updates to your personal or family cancer history. Screening Recommendations  As discussed in our initial appointment, a majority of cancer is sporadic, or arises by chance. Because of this, we know that you are still at risk to develop cancer, and that it is important that you continue the recommended cancer screenings. Due to your personal diagnosis of breast cancer, you should follow the breast screening recommendations proposed by your oncologist and the rest of your healthcare team following treatment. These may include recommendations such as:    Physical exam and history collection at least annually   May be more often depending on the recommendations of your healthcare team  Annual mammogram  The use of endocrine therapy (such as tamoxifen or aromatase inhibitors)1    Based on [employer] (NCCN) guidelines it is recommended that you follow routine surveillance and screening guidelines for other types of cancer including colorectal cancer screening via colonoscopy or other methodology as recommended by your healthcare team2,3. It is important to share these testing results and discuss guidelines with your healthcare team so that they can be aware if guidelines should change, and so they can continue to recommend screening based on your history. Lastly, it is important to note that certain lifestyle modifications can be made to help lower cancer risk. These include regular exercise, maintaining a diet high in fruits and vegetables and low in processed foods and red meat, limiting alcohol consumption, avoidance of smoking and maintaining a healthy weight2.     Inheritance and Family Members  Your results are not indicative of all of your family members, and any family members who develop cancer or who are worried about their cancer risk might consider genetic counseling. Every family member should share this family history of cancer with their healthcare providers so that they can receive personalized cancer screening recommendations. We would be happy to see any relatives in the clinic you were seen at, and relatives who are not in the area can find a genetic counselor by visiting [email or web address]     We hope that you find this letter and the information contained within it useful. We have included a copy of your test report with this letter for your records. Should you or any of your family members have any questions or concerns, please do not hesitate to reach out. It was a pleasure to meet you! Sincerely,   Liane Sahni, MS, [address]  Clinical Genetic Counselor  [phone]    Sources used:   [employer]olph ([id number]). Breast Cancer (version 4.2022). Retrieved from http://www.Encoding.com/  [facility] ([id number]). Colorectal Cancer Screening (version 3.2022). Retrieved from [employer].is. pdf  Arleth Bingham, PhD, Dread Sanchez MD, Jenkins Severe, MD, Luis Alexander MD, Iraj Suazo, PhD, Milvia Prado MD, Tameka Da Silva MD, MPH, Gee Herrera MD, Serenity Saldana. Kirstin Elliott MD, MPH, Mechele Breath. Alisson Jackson MD, Sue Da Silva MD, PhD, Missy Carson. Cade Pardo MD, Yeni Couch MD, Deysi Moreno MD, [address] Tank Olivas, Quirino Bethea. Navya Palacios MD, Rj Back MD, Jolynn Hopkins. Sixto Canales, PhD, MPH, Suanne Epley.  MD Onel, MPH, [address], [phone] for Colposcopy and Cervical Pathology, and American Society for Clinical Pathology Screening Guidelines for the Prevention and Early Detection of Cervical Cancer, American Journal of Clinical Pathology, Volume 137, Issue 4, April 2012, Pages 662-162, https://doi.org/[email or web address]/[email or web address]:    Summary:    Mehul Moses previously consented to genetic testing, and it was sent to [facility] for the CancerNext-Expanded panel. Results showed no clinically actionable findings.     CC:   Daksha Lam

## 2023-02-14 ENCOUNTER — OFFICE VISIT (OUTPATIENT)
Dept: INTERNAL MEDICINE CLINIC | Facility: CLINIC | Age: 43
End: 2023-02-14
Payer: COMMERCIAL

## 2023-02-14 VITALS
HEART RATE: 103 BPM | OXYGEN SATURATION: 100 % | TEMPERATURE: 97.3 F | HEIGHT: 62 IN | DIASTOLIC BLOOD PRESSURE: 102 MMHG | SYSTOLIC BLOOD PRESSURE: 167 MMHG | WEIGHT: 134 LBS | BODY MASS INDEX: 24.66 KG/M2

## 2023-02-14 DIAGNOSIS — G89.29 CHRONIC MIDLINE THORACIC BACK PAIN: Chronic | ICD-10-CM

## 2023-02-14 DIAGNOSIS — I10 PRIMARY HYPERTENSION: Primary | ICD-10-CM

## 2023-02-14 DIAGNOSIS — C50.911 MALIGNANT NEOPLASM OF RIGHT FEMALE BREAST, UNSPECIFIED ESTROGEN RECEPTOR STATUS, UNSPECIFIED SITE OF BREAST (HCC): ICD-10-CM

## 2023-02-14 DIAGNOSIS — G47.09 OTHER INSOMNIA: ICD-10-CM

## 2023-02-14 DIAGNOSIS — M54.6 CHRONIC MIDLINE THORACIC BACK PAIN: Chronic | ICD-10-CM

## 2023-02-14 PROCEDURE — 3078F DIAST BP <80 MM HG: CPT | Performed by: INTERNAL MEDICINE

## 2023-02-14 PROCEDURE — 3074F SYST BP LT 130 MM HG: CPT | Performed by: INTERNAL MEDICINE

## 2023-02-14 PROCEDURE — 99214 OFFICE O/P EST MOD 30 MIN: CPT | Performed by: INTERNAL MEDICINE

## 2023-02-14 RX ORDER — GABAPENTIN 100 MG/1
100 CAPSULE ORAL 3 TIMES DAILY PRN
Qty: 60 CAPSULE | Refills: 1 | Status: SHIPPED | OUTPATIENT
Start: 2023-02-14 | End: 2023-02-24

## 2023-02-14 RX ORDER — LOSARTAN POTASSIUM 25 MG/1
25 TABLET ORAL DAILY
Qty: 60 TABLET | Refills: 1 | Status: SHIPPED | OUTPATIENT
Start: 2023-02-14

## 2023-02-14 RX ORDER — METHOCARBAMOL 750 MG/1
750 TABLET, FILM COATED ORAL 2 TIMES DAILY
Qty: 30 TABLET | Refills: 1 | Status: SHIPPED | OUTPATIENT
Start: 2023-02-14

## 2023-02-14 RX ORDER — METHOCARBAMOL 750 MG/1
750 TABLET, FILM COATED ORAL 4 TIMES DAILY
COMMUNITY
End: 2023-02-14 | Stop reason: SDUPTHER

## 2023-02-14 ASSESSMENT — PATIENT HEALTH QUESTIONNAIRE - PHQ9
2. FEELING DOWN, DEPRESSED OR HOPELESS: 0
1. LITTLE INTEREST OR PLEASURE IN DOING THINGS: 0
SUM OF ALL RESPONSES TO PHQ QUESTIONS 1-9: 0
SUM OF ALL RESPONSES TO PHQ9 QUESTIONS 1 & 2: 0

## 2023-02-14 NOTE — PROGRESS NOTES
Chief Complaint   Patient presents with    1 Month Follow-Up     1 month follow-up     HPI  Patient comes to the clinic for a regular follow up. She is content and grateful. She tells me about the latest appointment with oncology specialist; she is looking forward to meeting with oncology team and going over to [facility]. She has a right axillar mass ultrasound-guided biopsy planned for next week. Ms. Gopi Gil mentions about three weeks of thoracic back pain, at the paraspinal muscles areas bilaterally, which is incompletely relieved with her current medications. She asks for refills on gabapentin and methocarbamol, saying that those do help somewhat. In addition, she has insomnia. In addition, she brought a BP log on her smart phone. Over the last couple of weeks, her BP values have been the followin/85, 137/84, 129/83, 143/84, and 134/82 mmHg. 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 We looked at her elevated blood pressure today and from previous office visits; together we decided to prescribe an antihypertensive for her to start on if the BP is also elevated throughout the day. No other problems mentioned today. Reviewed and updated this visit by provider:  Tobacco  Allergies  Meds  Problems  Med Hx  Surg Hx  Fam Hx       Review of Systems   Constitutional:  Negative for chills and fever. Respiratory:  Negative for cough and shortness of breath. Cardiovascular:  Negative for chest pain and palpitations. Musculoskeletal:  Positive for back pain. Negative for gait problem. Psychiatric/Behavioral:  Positive for sleep disturbance. Negative for behavioral problems.       Visit Vitals  BP (!) 167/102 (Site: Left Upper Arm, Position: Sitting, Cuff Size: Medium Adult)   Pulse (!) 103   Temp 97.3 °F (36.3 °C) (Temporal)   Ht 5' 2\" (1.575 m)   Wt 134 lb (60.8 kg)   SpO2 100%   BMI 24.51 kg/m² Physical Exam  Constitutional:       General: She is not in acute distress. Appearance: Normal appearance. She is not ill-appearing. Eyes:      Conjunctiva/sclera: Conjunctivae normal.      Pupils: Pupils are equal, round, and reactive to light. Cardiovascular:      Rate and Rhythm: Normal rate and regular rhythm. Heart sounds: Normal heart sounds. No murmur heard. Pulmonary:      Effort: Pulmonary effort is normal. No respiratory distress. Breath sounds: Normal breath sounds. Musculoskeletal:      Cervical back: Normal range of motion and neck supple. No tenderness. Comments: Tenderness at the paraspinal muscles bilaterally along the thoracic spine area. Skin:     Comments: A medium-sized soft, mildly tender round mass at the right axillar area. No local erythema or wounds. Neurological:      Mental Status: She is alert. Psychiatric:         Mood and Affect: Mood normal.         Behavior: Behavior normal.         Thought Content: Thought content normal.         Judgment: Judgment normal.     Assessment/Plan:  1. Primary hypertension  Overview:  Started on losartan February 2023. Encouraged to continue checking blood pressure throughout the day and bring a log on the next office visit. Assessment & Plan:  Continue to promote healthy lifestyle change that includes a low dietary sodium nutrition. It was pointed out that most sodium in the typical American diet comes from processed and restaurant foods, as opposed to salt added at the table or during the cooking process. Patient understood and was appreciative of the advise. Orders:  -     losartan (COZAAR) 25 MG tablet; Take 1 tablet by mouth daily, Disp-60 tablet, R-1Normal  2. Malignant neoplasm of right female breast, unspecified estrogen receptor status, unspecified site of breast [facility])  Comments:  Reviewed progress notes by oncology specialist dated 2/7/23.   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 One of two right axillary lymph nodes was positive for metastatic phyllodes tumor (1/2). Assessment & Plan:  Appreciate the assistance of multiple oncology specialists in the management of this patient. 3. Other insomnia  Comments:  Patient advised to increase the dose of Neurontin to 200 mg at night. Also, promote healthy sleep hygiene habits. Orders:  -     gabapentin (NEURONTIN) 100 MG capsule; Take 1 capsule by mouth 3 times daily as needed (pain) for up to 10 days. , Disp-60 capsule, R-1Normal  4. Chronic midline thoracic back pain  Comments:  Muscle relaxant refilled per patient request.  Orders:  -     methocarbamol (ROBAXIN) 750 MG tablet; Take 1 tablet by mouth 2 times daily, Disp-30 tablet, R-1Normal    Return in about 3 months (around 5/14/2023). On this date 2/14/2023 I have spent 30 minutes on patient care-related activities, including prior record review, laboratory work interpretation, the face to face encounter, history taking, physical exam and discussing the diagnoses and importance of compliance with the treatment plan as well as documenting on the day of the visit.     Angela Cole MD

## 2023-02-15 PROBLEM — I10 PRIMARY HYPERTENSION: Status: ACTIVE | Noted: 2023-02-15

## 2023-02-15 ASSESSMENT — ENCOUNTER SYMPTOMS
BACK PAIN: 1
COUGH: 0
SHORTNESS OF BREATH: 0

## 2023-02-15 NOTE — ASSESSMENT & PLAN NOTE
Continue to promote healthy lifestyle change that includes a low dietary sodium nutrition. It was pointed out that most sodium in the typical American diet comes from processed and restaurant foods, as opposed to salt added at the table or during the cooking process. Patient understood and was appreciative of the advise.

## 2023-05-30 ASSESSMENT — PATIENT HEALTH QUESTIONNAIRE - PHQ9
1. LITTLE INTEREST OR PLEASURE IN DOING THINGS: 0
SUM OF ALL RESPONSES TO PHQ QUESTIONS 1-9: 0
SUM OF ALL RESPONSES TO PHQ9 QUESTIONS 1 & 2: 0
2. FEELING DOWN, DEPRESSED OR HOPELESS: NOT AT ALL
SUM OF ALL RESPONSES TO PHQ9 QUESTIONS 1 & 2: 0
2. FEELING DOWN, DEPRESSED OR HOPELESS: 0
SUM OF ALL RESPONSES TO PHQ QUESTIONS 1-9: 0
1. LITTLE INTEREST OR PLEASURE IN DOING THINGS: NOT AT ALL
SUM OF ALL RESPONSES TO PHQ QUESTIONS 1-9: 0
SUM OF ALL RESPONSES TO PHQ QUESTIONS 1-9: 0

## 2023-06-01 ENCOUNTER — OFFICE VISIT (OUTPATIENT)
Dept: INTERNAL MEDICINE CLINIC | Facility: CLINIC | Age: 43
End: 2023-06-01
Payer: COMMERCIAL

## 2023-06-01 VITALS
TEMPERATURE: 98.1 F | HEIGHT: 62 IN | DIASTOLIC BLOOD PRESSURE: 92 MMHG | RESPIRATION RATE: 18 BRPM | BODY MASS INDEX: 26.35 KG/M2 | OXYGEN SATURATION: 99 % | SYSTOLIC BLOOD PRESSURE: 140 MMHG | WEIGHT: 143.2 LBS | HEART RATE: 87 BPM

## 2023-06-01 DIAGNOSIS — C80.1 SPINDLE CELL CARCINOMA (HCC): ICD-10-CM

## 2023-06-01 DIAGNOSIS — I10 PRIMARY HYPERTENSION: ICD-10-CM

## 2023-06-01 DIAGNOSIS — F41.9 ANXIETY: ICD-10-CM

## 2023-06-01 DIAGNOSIS — D48.60 PHYLLODES NEOPLASM OF BREAST: Primary | ICD-10-CM

## 2023-06-01 PROCEDURE — 3077F SYST BP >= 140 MM HG: CPT | Performed by: NURSE PRACTITIONER

## 2023-06-01 PROCEDURE — 99214 OFFICE O/P EST MOD 30 MIN: CPT | Performed by: NURSE PRACTITIONER

## 2023-06-01 PROCEDURE — 3080F DIAST BP >= 90 MM HG: CPT | Performed by: NURSE PRACTITIONER

## 2023-06-01 RX ORDER — ESCITALOPRAM OXALATE 10 MG/1
10 TABLET ORAL DAILY
Qty: 30 TABLET | Refills: 3 | Status: SHIPPED | OUTPATIENT
Start: 2023-06-01

## 2023-06-01 RX ORDER — LOSARTAN POTASSIUM 25 MG/1
25 TABLET ORAL DAILY
Qty: 60 TABLET | Refills: 1 | Status: CANCELLED | OUTPATIENT
Start: 2023-06-01

## 2023-06-01 RX ORDER — POTASSIUM CHLORIDE 20 MEQ/1
20 TABLET, EXTENDED RELEASE ORAL DAILY
COMMUNITY
Start: 2023-04-24

## 2023-06-01 RX ORDER — PROMETHAZINE HYDROCHLORIDE 25 MG/1
TABLET ORAL
COMMUNITY
Start: 2023-03-05

## 2023-06-01 RX ORDER — LOSARTAN POTASSIUM 50 MG/1
50 TABLET ORAL DAILY
Qty: 90 TABLET | Refills: 1 | Status: SHIPPED | OUTPATIENT
Start: 2023-06-01

## 2023-06-01 RX ORDER — GABAPENTIN 100 MG/1
100 CAPSULE ORAL 3 TIMES DAILY PRN
Qty: 60 CAPSULE | Refills: 1 | Status: CANCELLED | OUTPATIENT
Start: 2023-06-01 | End: 2023-06-11

## 2023-06-01 ASSESSMENT — ENCOUNTER SYMPTOMS
VOMITING: 0
NAUSEA: 0
CONSTIPATION: 0
DIARRHEA: 0
SHORTNESS OF BREATH: 0

## 2023-06-01 NOTE — PROGRESS NOTES
6/1/2023 11:41 AM  Location:[facility] [address] [facility]  [place]  Patient #:  [id number]  YOB: 1980          YOUR LAST HEMOGLOBIN A1CS:   No results found for: HBA1C, [id number]    YOUR LAST LIPID PROFILE:   Lab Results   Component Value Date/Time    CHOL 109 11/08/2022 12:27 PM    HDL 28 11/08/2022 12:27 PM         Lab Results   Component Value Date/Time    BUN 13 12/03/2022 07:14 AM     12/03/2022 07:14 AM    K 4.0 12/03/2022 07:14 AM     12/03/2022 07:14 AM    CO2 28 12/03/2022 07:14 AM           History of Present Illness     Chief Complaint   Patient presents with    Follow-up     3 month follow up        Ms. Richmond Aguilar is a 38 y.o. female  who presents for  follow up. Ms. Richmond Aguilar presents for follow-up. 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 Maynor Ayala has her involved in a clinical trial.  She has an upcoming chest CT and surgery to remove a new right mass of her chest wall and will be undergoing echocardiogram and starting clinical trial as chemotherapy was not effective. She has been to chemotherapy 3 weeks ago and has upcoming lab work with her oncologist.  She notes no leg swelling and is gaining her appetite back, reports anxiety for which she takes Ativan prior to office visits. She also has some associated insomnia. She does have a history of hypertension and is on losartan 25 mg daily. She denies any current chest pain, shortness of breath, fevers or chills, nausea or vomiting. She denies any bowel or bladder changes. 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           Allergies   Allergen Reactions    Adhesive Tape Hives     Past Medical History:   Diagnosis Date    Acid reflux     ADHD (attention deficit hyperactivity disorder)     Anxiety     Cancer ([address]

## 2023-07-14 ASSESSMENT — PATIENT HEALTH QUESTIONNAIRE - PHQ9
1. LITTLE INTEREST OR PLEASURE IN DOING THINGS: SEVERAL DAYS
2. FEELING DOWN, DEPRESSED OR HOPELESS: SEVERAL DAYS
SUM OF ALL RESPONSES TO PHQ9 QUESTIONS 1 & 2: 2
1. LITTLE INTEREST OR PLEASURE IN DOING THINGS: 1
2. FEELING DOWN, DEPRESSED OR HOPELESS: 1
SUM OF ALL RESPONSES TO PHQ QUESTIONS 1-9: 2
SUM OF ALL RESPONSES TO PHQ9 QUESTIONS 1 & 2: 2
SUM OF ALL RESPONSES TO PHQ QUESTIONS 1-9: 2

## 2023-07-17 ENCOUNTER — OFFICE VISIT (OUTPATIENT)
Dept: INTERNAL MEDICINE CLINIC | Facility: CLINIC | Age: 43
End: 2023-07-17
Payer: COMMERCIAL

## 2023-07-17 VITALS
BODY MASS INDEX: 28.89 KG/M2 | HEIGHT: 62 IN | SYSTOLIC BLOOD PRESSURE: 136 MMHG | TEMPERATURE: 98 F | WEIGHT: 157 LBS | HEART RATE: 92 BPM | OXYGEN SATURATION: 98 % | DIASTOLIC BLOOD PRESSURE: 81 MMHG

## 2023-07-17 DIAGNOSIS — I10 PRIMARY HYPERTENSION: ICD-10-CM

## 2023-07-17 DIAGNOSIS — F90.0 ATTENTION DEFICIT HYPERACTIVITY DISORDER (ADHD), PREDOMINANTLY INATTENTIVE TYPE: ICD-10-CM

## 2023-07-17 DIAGNOSIS — F41.9 ANXIETY: Primary | ICD-10-CM

## 2023-07-17 PROCEDURE — 3079F DIAST BP 80-89 MM HG: CPT | Performed by: NURSE PRACTITIONER

## 2023-07-17 PROCEDURE — 99214 OFFICE O/P EST MOD 30 MIN: CPT | Performed by: NURSE PRACTITIONER

## 2023-07-17 PROCEDURE — 3075F SYST BP GE 130 - 139MM HG: CPT | Performed by: NURSE PRACTITIONER

## 2023-07-17 RX ORDER — ATOMOXETINE 40 MG/1
40 CAPSULE ORAL DAILY
Qty: 30 CAPSULE | Refills: 3 | Status: SHIPPED | OUTPATIENT
Start: 2023-07-17

## 2023-07-17 RX ORDER — ESCITALOPRAM OXALATE 10 MG/1
5 TABLET ORAL DAILY
Qty: 30 TABLET | Refills: 3
Start: 2023-07-17

## 2023-07-17 RX ORDER — LOSARTAN POTASSIUM 50 MG/1
50 TABLET ORAL DAILY
Qty: 90 TABLET | Refills: 3 | Status: SHIPPED | OUTPATIENT
Start: 2023-07-17

## 2023-07-17 RX ORDER — TRAZODONE HYDROCHLORIDE 50 MG/1
50 TABLET ORAL NIGHTLY PRN
COMMUNITY
Start: 2023-07-11

## 2023-07-17 ASSESSMENT — ENCOUNTER SYMPTOMS: SHORTNESS OF BREATH: 0

## 2023-07-17 NOTE — PROGRESS NOTES
MAIN OR    MASTECTOMY Bilateral 12/2/2022    TOTAL BILATERAL BREAST MASTECTOMY performed by Gabriel Balderas MD at [address] N/A 12/2/2022    MUSCLE FLAP CLOSURE W/ SPLIT [facility] [address] RIGHT Right 11/9/2022    US BREAST NEEDLE BIOPSY RIGHT 11/9/2022 Maurice Griffith MD [facility]     Current Outpatient Medications   Medication Sig Dispense Refill    traZODone (DESYREL) 50 MG tablet Take 1 tablet by mouth nightly as needed      losartan (COZAAR) 50 MG tablet Take 1 tablet by mouth daily 90 tablet 1    promethazine (PHENERGAN) 25 MG tablet Do not start before March 5, 2023. Take 0.5-1 tablet by mouth every 4 hours PRN nausea      potassium chloride (KLOR-CON M) 20 MEQ extended release tablet Take 1 tablet by mouth daily      escitalopram (LEXAPRO) 10 MG tablet Take 1 tablet by mouth daily 30 tablet 3    methocarbamol (ROBAXIN) 750 MG tablet Take 1 tablet by mouth 2 times daily 30 tablet 1    gabapentin (NEURONTIN) 100 MG capsule Take 1 capsule by mouth 3 times daily as needed (pain) for up to 10 days. (Patient taking differently: Take 3 capsules by mouth 3 times daily as needed (pain). ) 60 capsule 1    Acetaminophen (TYLENOL) 325 MG CAPS Take by mouth      ferrous sulfate (IRON 325) 325 (65 Fe) MG tablet Take 1 tablet by mouth 2 times daily 180 tablet 1    Multiple Vitamins-Minerals (WOMENS MULTI PO) Take by mouth      Multiple Vitamins-Minerals (EMERGEN-C VITAMIN C PO) Take by mouth       No current facility-administered medications for this visit.      Health Maintenance   Topic Date Due    Varicella vaccine (1 of 2 - 2-dose childhood series) Never done    Pneumococcal 0-64 years Vaccine (1 - PCV) Never done    Shingles vaccine (1 of 2) Never done    Cervical cancer screen  Never done    DTaP/Tdap/Td vaccine (2 - Td or Tdap) 12/30/2017    COVID-19 Vaccine (3 - Moderna risk series) 05/13/2021    Flu vaccine (1)

## 2024-01-01 ENCOUNTER — PATIENT MESSAGE (OUTPATIENT)
Dept: INTERNAL MEDICINE CLINIC | Facility: CLINIC | Age: 44
End: 2024-01-01

## (undated) DEVICE — NEEDLE SPNL 22GA L3.5IN BLK HUB S STL REG WALL FIT STYL W/

## (undated) DEVICE — SUTURE PDS II SZ 0 L27IN ABSRB VLT L36MM CT-1 1/2 CIR Z340H

## (undated) DEVICE — PREVENA PLUS INCISION MANAGEMENT SYSTEM: Brand: PREVENA PLUS™

## (undated) DEVICE — SUTURE PERMAHAND SZ 0 L18IN NONABSORBABLE BLK SILK BRAID A186H

## (undated) DEVICE — PAD,NON-ADHERENT,3X8,STERILE,LF,1/PK: Brand: MEDLINE

## (undated) DEVICE — BLADE ES ELASTOMERIC COAT INSUL DURABLE BEND UPTO 90DEG

## (undated) DEVICE — NEEDLE SPNL L3.5IN PNK HUB S STL REG WALL FIT STYL W/ QNCKE

## (undated) DEVICE — SUTURE MCRYL SZ 2-0 L27IN ABSRB UD CP-1 1 L36MM 1/2 CIR REV Y266H

## (undated) DEVICE — PREMIUM WET SKIN PREP TRAY: Brand: MEDLINE INDUSTRIES, INC.

## (undated) DEVICE — GLOVE SURG SZ 7 CRM LTX FREE POLYISOPRENE POLYMER BEAD ANTI

## (undated) DEVICE — SOLUTION IRRIG 1000ML 09% SOD CHL USP PIC PLAS CONTAINER

## (undated) DEVICE — 48" PROBE COVER W/GEL, ULTRASOUND, STERILE: Brand: SITE-RITE

## (undated) DEVICE — APPLIER CLP L9.375IN APER 2.1MM CLS L3.8MM 20 SM TI CLP

## (undated) DEVICE — INTENDED FOR TISSUE SEPARATION, AND OTHER PROCEDURES THAT REQUIRE A SHARP SURGICAL BLADE TO PUNCTURE OR CUT.: Brand: BARD-PARKER ® STAINLESS STEEL BLADES

## (undated) DEVICE — 3 TO 1 DERMACARRIER: Brand: MESHGRAFTTM II TISSUE EXPANSION SYSTEM

## (undated) DEVICE — 3M™ IOBAN™ 2 ANTIMICROBIAL INCISE DRAPE 6651EZ: Brand: IOBAN™ 2

## (undated) DEVICE — SUTURE MCRYL SZ 3-0 L27IN ABSRB UD L19MM PS-2 3/8 CIR PRIM Y427H

## (undated) DEVICE — NEPTUNE E-SEP SMOKE EVACUATION PENCIL, COATED, 70MM BLADE, PUSH BUTTON SWITCH: Brand: NEPTUNE E-SEP

## (undated) DEVICE — DRAPE,TOP,102X53,STERILE: Brand: MEDLINE

## (undated) DEVICE — SYRINGE, LUER LOCK, 60ML: Brand: MEDLINE

## (undated) DEVICE — STAPLER SKIN ABSORBABLE 30 STRL INSORB

## (undated) DEVICE — 3M™ IOBAN™ 2 ANTIMICROBIAL INCISE DRAPE 6650EZ: Brand: IOBAN™ 2

## (undated) DEVICE — SHEET, DRAPE, SPLIT, STERILE: Brand: MEDLINE

## (undated) DEVICE — CANISTER, RIGID, 2000CC: Brand: MEDLINE INDUSTRIES, INC.

## (undated) DEVICE — SYSTEM SKIN CLSR 22CM DERMBND PRINEO

## (undated) DEVICE — GLOVE SURG SZ 7 L12IN FNGR THK79MIL GRN LTX FREE

## (undated) DEVICE — BASIC SINGLE BASIN-LF: Brand: MEDLINE INDUSTRIES, INC.

## (undated) DEVICE — SURGICAL PROCEDURE PACK BASIC ST FRANCIS

## (undated) DEVICE — RESERVOIR,SUCTION,100CC,SILICONE: Brand: MEDLINE

## (undated) DEVICE — GLOVE ORANGE PI 7   MSG9070

## (undated) DEVICE — MINOR SPLIT GENERAL: Brand: MEDLINE INDUSTRIES, INC.

## (undated) DEVICE — SUTURE STRATAFIX SPRL MCRYL + SZ 3-0 L18IN ABSRB UD PS-2 SXMP1B107

## (undated) DEVICE — SPONGE LAPAROTOMY W18XL18IN WHITE STRUNG RADIOPAQUE STERILE

## (undated) DEVICE — DRESSING,GAUZE,XEROFORM,CURAD,5"X9",ST: Brand: CURAD

## (undated) DEVICE — DERMATOME BLADES: Brand: DERMATOME

## (undated) DEVICE — SUTURE PERMA-HAND SZ 2-0 L30IN NONABSORBABLE BLK L26MM SH K833H

## (undated) DEVICE — BANDAGE,GAUZE,BULKEE II,4.5"X4.1YD,STRL: Brand: MEDLINE

## (undated) DEVICE — NEEDLE HYPO 18GA L1.5IN PNK S STL HUB POLYPR SHLD REG BVL

## (undated) DEVICE — DISSECTOR ENDOSCP L21CM TIP CURVATURE 40DEG FN CRV JAW VES

## (undated) DEVICE — ADHESIVE SKIN CLSR 0.7ML TOP DERMBND ADV

## (undated) DEVICE — SUTURE ETHLN SZ 4-0 L18IN NONABSORBABLE BLK L19MM PS-2 3/8 1667H

## (undated) DEVICE — DRAIN SURG 15FR SIL RND CHN W/ TRCR FULL FLUT DBL WRP TRAD

## (undated) DEVICE — DRAIN SURG 3/4 W10MMXL20CM 100% SIL PERF FLAT HUBLESS

## (undated) DEVICE — ELECTRODE PT RET AD L9FT HI MOIST COND ADH HYDRGEL CORDED

## (undated) DEVICE — NEEDLE HYPO 21GA L1IN GRN S STL HUB POLYPR SHLD REG BVL

## (undated) DEVICE — KIT DRSG SM W12.5XH3.2XL18CM VAC SH DRP PD W/ CONN DISP RUL

## (undated) DEVICE — MICRODISSECTION NEEDLE STRAIGHT SLEEVE: Brand: COLORADO

## (undated) DEVICE — SUTURE VCRL SZ 3-0 L18IN ABSRB UD PS-2 L19MM 1/2 CIR J497G

## (undated) DEVICE — SUTURE VCRL SZ 3-0 L18IN ABSRB UD L26MM SH 1/2 CIR J864D

## (undated) DEVICE — Device